# Patient Record
Sex: FEMALE | Race: WHITE | Employment: UNEMPLOYED | URBAN - METROPOLITAN AREA
[De-identification: names, ages, dates, MRNs, and addresses within clinical notes are randomized per-mention and may not be internally consistent; named-entity substitution may affect disease eponyms.]

---

## 2017-07-14 PROBLEM — B35.1 ONYCHOMYCOSIS: Status: ACTIVE | Noted: 2017-07-14

## 2017-07-14 PROBLEM — Z79.4 CONTROLLED TYPE 2 DIABETES MELLITUS WITHOUT COMPLICATION, WITH LONG-TERM CURRENT USE OF INSULIN (HCC): Status: ACTIVE | Noted: 2017-07-14

## 2017-07-14 PROBLEM — E11.9 CONTROLLED TYPE 2 DIABETES MELLITUS WITHOUT COMPLICATION, WITH LONG-TERM CURRENT USE OF INSULIN (HCC): Status: ACTIVE | Noted: 2017-07-14

## 2017-08-11 PROBLEM — Z79.4 CONTROLLED TYPE 2 DIABETES MELLITUS WITHOUT COMPLICATION, WITH LONG-TERM CURRENT USE OF INSULIN (HCC): Status: RESOLVED | Noted: 2017-07-14 | Resolved: 2017-08-11

## 2017-08-11 PROBLEM — E11.9 CONTROLLED TYPE 2 DIABETES MELLITUS WITHOUT COMPLICATION, WITH LONG-TERM CURRENT USE OF INSULIN (HCC): Status: RESOLVED | Noted: 2017-07-14 | Resolved: 2017-08-11

## 2017-08-11 PROBLEM — F32.1 MODERATE SINGLE CURRENT EPISODE OF MAJOR DEPRESSIVE DISORDER (HCC): Status: ACTIVE | Noted: 2017-08-11

## 2018-04-11 PROBLEM — E78.00 PURE HYPERCHOLESTEROLEMIA: Status: ACTIVE | Noted: 2018-04-11

## 2020-03-09 ENCOUNTER — HOSPITAL ENCOUNTER (EMERGENCY)
Age: 40
Discharge: HOME | End: 2020-03-09
Attending: EMERGENCY MEDICINE
Payer: COMMERCIAL

## 2020-03-09 ENCOUNTER — APPOINTMENT (OUTPATIENT)
Dept: ULTRASOUND IMAGING | Age: 40
End: 2020-03-09
Payer: COMMERCIAL

## 2020-03-09 VITALS
BODY MASS INDEX: 26.96 KG/M2 | SYSTOLIC BLOOD PRESSURE: 120 MMHG | TEMPERATURE: 98 F | OXYGEN SATURATION: 99 % | HEIGHT: 69 IN | HEART RATE: 88 BPM | RESPIRATION RATE: 18 BRPM | WEIGHT: 182 LBS | DIASTOLIC BLOOD PRESSURE: 74 MMHG

## 2020-03-09 DIAGNOSIS — O02.1 MISSED ABORTION: Primary | ICD-10-CM

## 2020-03-09 LAB
ABO + RH BLD: NORMAL
ALBUMIN SERPL-MCNC: 3.3 G/DL (ref 3.5–5)
ALBUMIN/GLOB SERPL: 0.8 {RATIO} (ref 1.2–3.5)
ALP SERPL-CCNC: 133 U/L (ref 50–130)
ALT SERPL-CCNC: 24 U/L (ref 12–65)
ANION GAP SERPL CALC-SCNC: 4 MMOL/L (ref 7–16)
AST SERPL-CCNC: 15 U/L (ref 15–37)
BASOPHILS # BLD: 0 K/UL (ref 0–0.2)
BASOPHILS NFR BLD: 0 % (ref 0–2)
BILIRUB SERPL-MCNC: 0.2 MG/DL (ref 0.2–1.1)
BUN SERPL-MCNC: 14 MG/DL (ref 6–23)
CALCIUM SERPL-MCNC: 9.1 MG/DL (ref 8.3–10.4)
CHLORIDE SERPL-SCNC: 102 MMOL/L (ref 98–107)
CO2 SERPL-SCNC: 29 MMOL/L (ref 21–32)
CREAT SERPL-MCNC: 0.75 MG/DL (ref 0.6–1)
DIFFERENTIAL METHOD BLD: ABNORMAL
EOSINOPHIL # BLD: 0.1 K/UL (ref 0–0.8)
EOSINOPHIL NFR BLD: 1 % (ref 0.5–7.8)
ERYTHROCYTE [DISTWIDTH] IN BLOOD BY AUTOMATED COUNT: 13.6 % (ref 11.9–14.6)
GLOBULIN SER CALC-MCNC: 3.9 G/DL (ref 2.3–3.5)
GLUCOSE SERPL-MCNC: 284 MG/DL (ref 65–100)
HCG SERPL-ACNC: 1418 MIU/ML (ref 0–6)
HCT VFR BLD AUTO: 40.7 % (ref 35.8–46.3)
HGB BLD-MCNC: 13.1 G/DL (ref 11.7–15.4)
IMM GRANULOCYTES # BLD AUTO: 0.1 K/UL (ref 0–0.5)
IMM GRANULOCYTES NFR BLD AUTO: 1 % (ref 0–5)
LYMPHOCYTES # BLD: 2.7 K/UL (ref 0.5–4.6)
LYMPHOCYTES NFR BLD: 24 % (ref 13–44)
MCH RBC QN AUTO: 27.8 PG (ref 26.1–32.9)
MCHC RBC AUTO-ENTMCNC: 32.2 G/DL (ref 31.4–35)
MCV RBC AUTO: 86.4 FL (ref 79.6–97.8)
MONOCYTES # BLD: 1 K/UL (ref 0.1–1.3)
MONOCYTES NFR BLD: 9 % (ref 4–12)
NEUTS SEG # BLD: 7.5 K/UL (ref 1.7–8.2)
NEUTS SEG NFR BLD: 65 % (ref 43–78)
NRBC # BLD: 0 K/UL (ref 0–0.2)
PLATELET # BLD AUTO: 489 K/UL (ref 150–450)
PMV BLD AUTO: 8.9 FL (ref 9.4–12.3)
POTASSIUM SERPL-SCNC: 4.1 MMOL/L (ref 3.5–5.1)
PROT SERPL-MCNC: 7.2 G/DL (ref 6.3–8.2)
RBC # BLD AUTO: 4.71 M/UL (ref 4.05–5.2)
SERVICE CMNT-IMP: NORMAL
SODIUM SERPL-SCNC: 135 MMOL/L (ref 136–145)
WBC # BLD AUTO: 11.5 K/UL (ref 4.3–11.1)
WET PREP GENITAL: NORMAL
WET PREP GENITAL: NORMAL

## 2020-03-09 PROCEDURE — 81003 URINALYSIS AUTO W/O SCOPE: CPT

## 2020-03-09 PROCEDURE — 87491 CHLMYD TRACH DNA AMP PROBE: CPT

## 2020-03-09 PROCEDURE — 76817 TRANSVAGINAL US OBSTETRIC: CPT

## 2020-03-09 PROCEDURE — 99284 EMERGENCY DEPT VISIT MOD MDM: CPT

## 2020-03-09 PROCEDURE — 85025 COMPLETE CBC W/AUTO DIFF WBC: CPT

## 2020-03-09 PROCEDURE — 88305 TISSUE EXAM BY PATHOLOGIST: CPT

## 2020-03-09 PROCEDURE — 87210 SMEAR WET MOUNT SALINE/INK: CPT

## 2020-03-09 PROCEDURE — 86900 BLOOD TYPING SEROLOGIC ABO: CPT

## 2020-03-09 PROCEDURE — 80053 COMPREHEN METABOLIC PANEL: CPT

## 2020-03-09 PROCEDURE — 84702 CHORIONIC GONADOTROPIN TEST: CPT

## 2020-03-09 RX ORDER — FOLIC ACID/MULTIVIT,IRON,MINER 0.4MG-18MG
1 TABLET ORAL DAILY
Qty: 30 TAB | Refills: 0 | Status: SHIPPED | OUTPATIENT
Start: 2020-03-09 | End: 2022-02-04

## 2020-03-09 NOTE — ED PROVIDER NOTES
Patient is  3 para 2 and states she is approximately 9 weeks pregnant. She started with some spotting this morning and is now passing clots. She went to [facility] today and saw a nurse but the doctor could not see her so she came here. She states that her first appointment with the OB is 1 week from today. She has never had a miscarriage. She states her blood type is O+. She is not having any chest pain, shortness of breath, abdominal pain, dizziness, weakness, trouble with urination, vaginal discharge or other new symptoms. She did ambulate to the room without difficulty and is well-hydrated. She is having some light cramping. She has not had an ultrasound yet. The history is provided by the patient. Pregnancy Problem    This is a new problem. The current episode started 3 to 5 hours ago. The problem occurs constantly. The problem has been gradually worsening. The pain is located in the suprapubic region. The pain is at a severity of 2/10. The pain is mild. Pertinent negatives include no anorexia, no fever, no belching, no diarrhea, no flatus, no hematochezia, no melena, no nausea, no vomiting, no constipation, no dysuria, no frequency, no hematuria, no headaches, no arthralgias, no myalgias, no trauma, no chest pain and no back pain. Nothing worsens the pain. The pain is relieved by nothing. Past Medical History:   Diagnosis Date    Diabetes ([address]     Hyperlipidemia     PCOD (polycystic ovarian disease)        Past Surgical History:   Procedure Laterality Date    HX APPENDECTOMY      HX CHOLECYSTECTOMY      HX GYN  age 15yo    laparotomy w/  ovarian cystecotomy; believes ovary still intact          History reviewed. No pertinent family history.     Social History     Socioeconomic History    Marital status:      Spouse name: Not on file    Number of children: Not on file    Years of education: Not on file    Highest education level: Not on file   Occupational History    Not on file   Social Needs    Financial resource strain: Not on file    Food insecurity:     Worry: Not on file     Inability: Not on file    Transportation needs:     Medical: Not on file     Non-medical: Not on file   Tobacco Use    Smoking status: Never Smoker    Smokeless tobacco: Never Used   Substance and Sexual Activity    Alcohol use: No     Alcohol/week: 0.0 standard drinks    Drug use: No    Sexual activity: Not on file   Lifestyle    Physical activity:     Days per week: Not on file     Minutes per session: Not on file    Stress: Not on file   Relationships    Social connections:     Talks on phone: Not on file     Gets together: Not on file     Attends Congregation service: Not on file     Active member of club or organization: Not on file     Attends meetings of clubs or organizations: Not on file     Relationship status: Not on file    Intimate partner violence:     Fear of current or ex partner: Not on file     Emotionally abused: Not on file     Physically abused: Not on file     Forced sexual activity: Not on file   Other Topics Concern    Not on file   Social History Narrative    Not on file         ALLERGIES: Shellfish derived    Review of Systems   Constitutional: Negative. Negative for fever. HENT: Negative. Eyes: Negative. Respiratory: Negative. Cardiovascular: Negative. Negative for chest pain. Gastrointestinal: Negative. Negative for anorexia, constipation, diarrhea, flatus, hematochezia, melena, nausea and vomiting. Genitourinary: Positive for pelvic pain. Negative for dysuria, frequency and hematuria. Musculoskeletal: Negative. Negative for arthralgias, back pain and myalgias. Skin: Negative. Neurological: Negative. Negative for headaches. Psychiatric/Behavioral: Negative. All other systems reviewed and are negative.       Vitals:    03/09/20 1424   BP: 126/84   Pulse: (!) 110   Resp: 16   Temp: 98.2 °F (36.8 °C)   SpO2: 99%   Weight: 82.6 kg (182 lb)   Height: 5' 9\" (1.753 m)            Physical Exam  Vitals signs and nursing note reviewed. Constitutional:       Appearance: She is well-developed. HENT:      Head: Normocephalic and atraumatic. Right Ear: External ear normal.      Left Ear: External ear normal.      Nose: Nose normal.   Eyes:      Conjunctiva/sclera: Conjunctivae normal.      Pupils: Pupils are equal, round, and reactive to light. Neck:      Musculoskeletal: Normal range of motion and neck supple. Cardiovascular:      Rate and Rhythm: Normal rate and regular rhythm. Heart sounds: Normal heart sounds. Pulmonary:      Effort: Pulmonary effort is normal.      Breath sounds: Normal breath sounds. Abdominal:      General: Bowel sounds are normal.      Palpations: Abdomen is soft. Musculoskeletal: Normal range of motion. Skin:     General: Skin is warm and dry. Neurological:      Mental Status: She is alert and oriented to person, place, and time. Deep Tendon Reflexes: Reflexes are normal and symmetric. Psychiatric:         Behavior: Behavior normal.         Thought Content: Thought content normal.         Judgment: Judgment normal.          MDM  Number of Diagnoses or Management Options     Amount and/or Complexity of Data Reviewed  Clinical lab tests: reviewed  Tests in the radiology section of CPT®: ordered    Risk of Complications, Morbidity, and/or Mortality  Presenting problems: moderate  Diagnostic procedures: moderate  Management options: moderate           Procedures        8:05 PM Spoke with Dr. Jennifer Casey regarding patient. We discussed the history, physical exam, lab and US findings. Patient states she had her other 2 children in [facility] and that she is certain her blood type is O+. It was not drawn initially when she came in and she does not want to wait on that, however it is back now.  She is going to call [facility] in the morning for a follow-up appointment in 48 hours and a recheck of her quantitative hCG and possible ultrasound if they deem it necessary. She was told to return if worsening in any way. I did prescribe prenatal multivitamins and told her to rest, and drink plenty of fluids. She is stable for discharge and ambulatory out of the ER at this time. Products of conception is pending in the lab. The patient was observed in the ED. Results Reviewed:      Recent Results (from the past 24 hour(s))   CBC WITH AUTOMATED DIFF    Collection Time: 03/09/20  4:05 PM   Result Value Ref Range    WBC 11.5 (H) 4.3 - 11.1 K/uL    RBC 4.71 4.05 - 5.2 M/uL    HGB 13.1 11.7 - 15.4 g/dL    HCT 40.7 35.8 - 46.3 %    MCV 86.4 79.6 - 97.8 FL    MCH 27.8 26.1 - 32.9 PG    MCHC 32.2 31.4 - 35.0 g/dL    RDW 13.6 11.9 - 14.6 %    PLATELET 080 (H) 952 - 450 K/uL    MPV 8.9 (L) 9.4 - 12.3 FL    ABSOLUTE NRBC 0.00 0.0 - 0.2 K/uL    DF AUTOMATED      NEUTROPHILS 65 43 - 78 %    LYMPHOCYTES 24 13 - 44 %    MONOCYTES 9 4.0 - 12.0 %    EOSINOPHILS 1 0.5 - 7.8 %    BASOPHILS 0 0.0 - 2.0 %    IMMATURE GRANULOCYTES 1 0.0 - 5.0 %    ABS. NEUTROPHILS 7.5 1.7 - 8.2 K/UL    ABS. LYMPHOCYTES 2.7 0.5 - 4.6 K/UL    ABS. MONOCYTES 1.0 0.1 - 1.3 K/UL    ABS. EOSINOPHILS 0.1 0.0 - 0.8 K/UL    ABS. BASOPHILS 0.0 0.0 - 0.2 K/UL    ABS. IMM. GRANS. 0.1 0.0 - 0.5 K/UL   METABOLIC PANEL, COMPREHENSIVE    Collection Time: 03/09/20  4:05 PM   Result Value Ref Range    Sodium 135 (L) 136 - 145 mmol/L    Potassium 4.1 3.5 - 5.1 mmol/L    Chloride 102 98 - 107 mmol/L    CO2 29 21 - 32 mmol/L    Anion gap 4 (L) 7 - 16 mmol/L    Glucose 284 (H) 65 - 100 mg/dL    BUN 14 6 - 23 MG/DL    Creatinine 0.75 0.6 - 1.0 MG/DL    GFR est AA >60 >60 ml/min/1.73m2    GFR est non-AA >60 >60 ml/min/1.73m2    Calcium 9.1 8.3 - 10.4 MG/DL    Bilirubin, total 0.2 0.2 - 1.1 MG/DL    ALT (SGPT) 24 12 - 65 U/L    AST (SGOT) 15 15 - 37 U/L    Alk.  phosphatase 133 (H) 50 - 130 U/L    Protein, total 7.2 6.3 - 8.2 g/dL    Albumin 3.3 (L) 3.5 - 5.0 g/dL Globulin 3.9 (H) 2.3 - 3.5 g/dL    A-G Ratio 0.8 (L) 1.2 - 3.5     BETA HCG, QT    Collection Time: 20  4:05 PM   Result Value Ref Range    Beta HCG, QT 1,418 (H) 0.0 - 6.0 MIU/ML   BLOOD TYPE, (ABO+RH)    Collection Time: 20  4:05 PM   Result Value Ref Range    ABO/Rh(D) Zandra Hatchet POSITIVE    WET PREP    Collection Time: 20  7:10 PM   Result Value Ref Range    Special Requests: NO SPECIAL REQUESTS      Wet prep WBC'S  0 TO 5        Wet prep NO YEAST,TRICHOMONAS OR CLUE CELLS NOTED       US [facility]   Final Result   IMPRESSION:  Findings most consistent with incomplete  within the   endocervical canal. Follow-up ultrasound and quantitative beta-hCG levels is   recommended. This structure within the endocervical canal does not have the   appearance of complex nabothian cysts. I discussed the results of all labs, procedures, radiographs, and treatments with the patient and available family. Treatment plan is agreed upon and the patient is ready for discharge. All voiced understanding of the discharge plan and medication instructions or changes as appropriate. Questions about treatment in the ED were answered. All were encouraged to return should symptoms worsen or new problems develop.

## 2020-03-09 NOTE — ED TRIAGE NOTES
Pt in states she is 9 weeks pregnant and started having vaginal bleeding yesterday. states now passing clots. States constipation.

## 2020-03-09 NOTE — LETTER
[id number] [address] EMERGENCY DEPT 
[address] [facility] [id number] [phone] Work/School Note Date: 3/9/2020 To Whom It May concern: 
 
Caty Cardenas was seen and treated today in the emergency room by the following provider(s): 
Attending Provider: Maykel Landon DO Physician Assistant: OSWALDO Aldridge. Caty Cardenas may return to work on 03/12/20. Sincerely, OSWALDO Cruz

## 2020-03-10 NOTE — DISCHARGE INSTRUCTIONS
Patient Education        Call [facility] tomorrow for an appointment on Wednesday, 03/11/20 for follow up. You will need repeat a repeat quantitative HCG and possible US. Return to the ED sooner if worse in anyway. Drink plenty of fluids, rest and no intercourse until follow up. Aborto espontáneo incompleto: Instrucciones de cuidado - [ Incomplete Miscarriage: Care Instructions ]  Instrucciones de cuidado    Un aborto espontáneo es la pérdida de un embarazo que ocurre king las primeras 20 semanas. Los abortos espontáneos son muy comunes. La mayoría ocurren debido a que el óvulo fecundado en el útero no se desarrolla de manera normal. Ni el estrés, ni el ejercicio ni las relaciones sexuales causan un aborto espontáneo. Aunque muchos abortos espontáneos se completan por sí solos, algunos necesitan intervención. Se conocen asha abortos espontáneos incompletos porque no se eliminan todos los tejidos del útero relacionados con Audria Conradi. El aborto espontáneo incompleto con frecuencia requiere tratamiento. Para eliminar los tejidos del útero, se utilizan medicamentos o un procedimiento llamado dilatación y legrado (D&C, por jeremy siglas en inglés). Si enrique kyra sanguíneo es Rh negativo, pregúntele a enrique médico si necesita rober inyección de inmunoglobulina Rh (RhoGAM) para prevenir problemas en embarazos futuros. La atención de seguimiento es rober parte clave de enrique tratamiento y seguridad. Asegúrese de hacer y acudir a todas las citas, y llame a enrique médico si está teniendo problemas. También es rober buena idea saber los resultados de jeremy exámenes y mantener rober lista de los medicamentos que shira. ¿Cómo puede cuidarse en el hogar? · Es probable que tenga sangrado vaginal king 1 a 2 semanas después del tratamiento. Podría ser similar o un poco más intenso que el período menstrual habitual. Use toallas sanitarias en lugar de tampones.  Puede usar tampones king el siguiente período menstrual, el cual debería comenzar dentro de 3 a 6 semanas. · [place] un analgésico (medicamento para el dolor) de venta gentry, asha acetaminofén (Tylenol), ibuprofeno (Advil, Motrin) o naproxeno (Aleve) para los cólicos. Podría sentir cólicos king varios días después del aborto espontáneo. Ingrid y siga todas las instrucciones de la [place]. · No tome dos o más analgésicos al [employer], a menos que el médico se lo haya indicado. Muchos analgésicos contienen acetaminofén, es decir, Tylenol. El exceso de acetaminofén (Tylenol) puede ser dañino. · Enrique médico podría pedirle que guarde cualquier tejido o coágulo que elimine en un recipiente transparente. Llévelo al consultorio médico de inmediato. · No tenga relaciones sexuales hasta que deje de sangrar. · Usted puede volver a jeremy actividades normales si se siente lo suficientemente rivka para hacerlo. Ann debería evitar hacer ejercicio arduo hasta que deje de sangrar. · Si desea volver a tratar de quedar embarazada, generalmente es seguro cuando se sienta lista. Hable con enrique médico acerca de cualquier plan futuro de embarazo. · Si no desea quedar embarazada, hable con enrique médico sobre métodos anticonceptivos. Podría volver a quedar embarazada antes de que comience enrique siguiente período menstrual.  · Podría tener un nivel bajo de dmitry por la pérdida de [place]. Consuma rober dieta equilibrada marco en dmitry y vitamina C. Entre los alimentos ricos en dmitry se encuentran las francisco javier alicea, los [place], los [place], los frijoles (habichuelas) y las verduras de hoja torin. Hable con enrique médico acerca de si usted necesita jacky pastillas de dmitry o un multivitamínico.  · La pérdida de un embarazo puede ser muy difícil. Dese tiempo a sí misma y jennifer tiempo a enrique nicol para procesar el duelo. Incluso si enrique aborto espontáneo ocurrió muy temprano, es posible que aún tenga sentimientos de pérdida. Usted podría preguntarse por qué sucedió y sentirse culpable.   ? Hablar con jeremy familiares, amigos o un consejero puede ayudarle a sobrellevar la pérdida. ? Si jeremy sentimientos de tristeza jacobs más de 2 semanas, hable con enrique médico o un consejero. ¿Cuándo debe pedir ayuda? Llame al 911 en cualquier momento que considere que necesita atención de [place]. Por ejemplo, llame si:    · Tiene dolor repentino e intenso en el abdomen.     · Se desmayó (perdió el conocimiento).    Llame a enrique médico ahora mismo o busque atención médica inmediata si:    · Tiene sangrado vaginal intenso. [place] significa que empapa las toallas sanitarias que suele usar cada hora, king 2 o más horas.     · Siente mareos o aturdimiento, o que está a punto de desmayarse.     · Tiene mayor dolor o dolor nuevo en el abdomen o la pelvis.     · Elimina tejidos, no solo rubia.     · Se siente deprimida o no puede hacer jeremy actividades diarias.    Preste especial atención a los cambios en enrique piyush y asegúrese de comunicarse con enrique médico si:    · Sangra más de lo que habitualmente sangra cuando tiene enrique período menstrual.     · No mejora asha se esperaba.     · Tiene cualquier síntoma nuevo, asha fiebre.     · Tiene flujo vaginal con un olor desagradable. ¿Dónde puede encontrar más información en inglés? Kristen Burt a http://yudi-arlyn.info/. Escriba L103 en la búsqueda para aprender más acerca de \"Aborto espontáneo incompleto: Instrucciones de cuidado - [ Incomplete Miscarriage: Care Instructions ]. \"  Revisado: 29 vill[address]en[address], 2019  Versión del contenido: 12.2  © 7199-0475 [employer]. Las instrucciones de cuidado fueron adaptadas bajo licencia por [facility] (which disclaims liability or warranty for this information). Si usted tiene [place] afección médica o sobre estas instrucciones, siempre pregunte a enrique profesional de piyush. [facility] niega toda garantía o responsabilidad por enrique uso de esta información.

## 2020-03-10 NOTE — ED NOTES
I have reviewed discharge instructions with the patient. The patient verbalized understanding. Patient left ED via Discharge Method: ambulatory to Home with self. Opportunity for questions and clarification provided. Patient given 1 scripts. To continue your aftercare when you leave the hospital, you may receive an automated call from our care team to check in on how you are doing. This is a free service and part of our promise to provide the best care and service to meet your aftercare needs.  If you have questions, or wish to unsubscribe from this service please call [phone]. Thank you for Choosing our [facility] Emergency Department.

## 2020-03-12 LAB
C TRACH RRNA SPEC QL NAA+PROBE: NEGATIVE
N GONORRHOEA RRNA SPEC QL NAA+PROBE: NEGATIVE
SPECIMEN SOURCE: NORMAL

## 2020-06-19 PROBLEM — E11.9 CONTROLLED TYPE 2 DIABETES MELLITUS WITHOUT COMPLICATION, WITH LONG-TERM CURRENT USE OF INSULIN (HCC): Status: ACTIVE | Noted: 2020-06-19

## 2020-06-19 PROBLEM — Z79.4 CONTROLLED TYPE 2 DIABETES MELLITUS WITHOUT COMPLICATION, WITH LONG-TERM CURRENT USE OF INSULIN (HCC): Status: ACTIVE | Noted: 2020-06-19

## 2020-07-19 PROBLEM — N97.9 FEMALE INFERTILITY, SECONDARY: Status: ACTIVE | Noted: 2020-06-16

## 2020-09-10 PROBLEM — Z64.1 MULTIPAROUS: Status: ACTIVE | Noted: 2020-09-10

## 2020-09-10 PROBLEM — O24.111 PRE-EXISTING TYPE 2 DIABETES MELLITUS DURING PREGNANCY IN FIRST TRIMESTER: Status: ACTIVE | Noted: 2020-09-10

## 2020-09-10 PROBLEM — O09.521 AMA (ADVANCED MATERNAL AGE) MULTIGRAVIDA 35+, FIRST TRIMESTER: Status: ACTIVE | Noted: 2020-09-10

## 2020-10-21 PROBLEM — Z98.891 PREVIOUS CESAREAN SECTION: Status: ACTIVE | Noted: 2020-09-10

## 2020-11-04 PROBLEM — O34.12 UTERINE FIBROIDS AFFECTING PREGNANCY IN SECOND TRIMESTER: Status: ACTIVE | Noted: 2020-11-04

## 2020-11-04 PROBLEM — E11.9 CONTROLLED TYPE 2 DIABETES MELLITUS WITHOUT COMPLICATION, WITH LONG-TERM CURRENT USE OF INSULIN (HCC): Status: RESOLVED | Noted: 2020-06-19 | Resolved: 2020-11-04

## 2020-11-04 PROBLEM — O09.92 HIGH-RISK PREGNANCY IN SECOND TRIMESTER: Status: ACTIVE | Noted: 2020-09-10

## 2020-11-04 PROBLEM — Z79.4 CONTROLLED TYPE 2 DIABETES MELLITUS WITHOUT COMPLICATION, WITH LONG-TERM CURRENT USE OF INSULIN (HCC): Status: RESOLVED | Noted: 2020-06-19 | Resolved: 2020-11-04

## 2020-11-04 PROBLEM — E28.2 POLYCYSTIC OVARY AFFECTING PREGNANCY, ANTEPARTUM: Status: ACTIVE | Noted: 2020-11-04

## 2020-11-04 PROBLEM — O09.522 MULTIGRAVIDA OF ADVANCED MATERNAL AGE IN SECOND TRIMESTER: Status: ACTIVE | Noted: 2020-09-10

## 2020-11-04 PROBLEM — O34.80 POLYCYSTIC OVARY AFFECTING PREGNANCY, ANTEPARTUM: Status: ACTIVE | Noted: 2020-11-04

## 2020-11-04 PROBLEM — F32.A DEPRESSION AFFECTING PREGNANCY: Status: ACTIVE | Noted: 2017-08-11

## 2020-11-04 PROBLEM — D25.9 UTERINE FIBROIDS AFFECTING PREGNANCY IN SECOND TRIMESTER: Status: ACTIVE | Noted: 2020-11-04

## 2020-11-04 PROBLEM — E78.00 PURE HYPERCHOLESTEROLEMIA: Status: RESOLVED | Noted: 2018-04-11 | Resolved: 2020-11-04

## 2020-11-04 PROBLEM — O99.340 DEPRESSION AFFECTING PREGNANCY: Status: ACTIVE | Noted: 2017-08-11

## 2020-11-04 PROBLEM — O09.02 PREGNANCY WITH HISTORY OF INFERTILITY IN SECOND TRIMESTER: Status: ACTIVE | Noted: 2020-06-16

## 2020-11-04 PROBLEM — B35.1 ONYCHOMYCOSIS: Status: RESOLVED | Noted: 2017-07-14 | Resolved: 2020-11-04

## 2020-11-04 PROBLEM — O24.112 PRE-EXISTING TYPE 2 DIABETES MELLITUS DURING PREGNANCY IN SECOND TRIMESTER: Status: ACTIVE | Noted: 2020-09-10

## 2020-11-04 PROBLEM — O34.219 HISTORY OF CESAREAN SECTION COMPLICATING PREGNANCY: Status: ACTIVE | Noted: 2020-09-10

## 2020-11-09 ENCOUNTER — HOSPITAL ENCOUNTER (OUTPATIENT)
Dept: DIABETES SERVICES | Age: 40
Discharge: HOME | End: 2020-11-09
Payer: COMMERCIAL

## 2020-11-11 ENCOUNTER — HOSPITAL ENCOUNTER (OUTPATIENT)
Dept: DIABETES SERVICES | Age: 40
Discharge: HOME | End: 2020-11-11
Payer: COMMERCIAL

## 2020-11-11 VITALS — WEIGHT: 237 LBS | BODY MASS INDEX: 35 KG/M2

## 2020-11-11 PROCEDURE — G0109 DIAB MANAGE TRN IND/GROUP: HCPCS

## 2020-11-11 NOTE — PROGRESS NOTES
This is a class  appointment with limited persons allowed in class due to JTLIT-27 public health emergency. Social distancing and mandatory precautions are in place and utilized. Gestational Diabetes Self-Management Support Plan: Pt with type 2 diabetes and pregnant    Services Provided: Pt received education on nutrition and meal planning during pregnancy. Emotional support for adjustment to diagnosis was provided. Pt came for education on 11/9/20 but had a hypoglycemic episode; therefore, returned today for education. Dr. Lopez, endocrinologist notified and CDE asked if the [facility] 2 could be ordered due to pt with hypoglycemia unawareness. Notifed Dr. Villafana Sa office on 11/9/20. Care Plan/Recommendations:  Pt instructed to record blood sugar 4x/day and record all meals and snacks. Pt to bring information to appointments with [facility] Maternal Fetal Medicine. Notes for Follow Up:   1. Barriers to checking blood glucose and adherence to meal plan identified: Has Joseline CGM. Just got the [facility] 2 which has alarms to help prevent hypoglycemia unawareness  2. Barriers to psychological adjustment to diagnosis identified: none  3. Patient needs to be seen by [facility] Fetal Medicine ASAP due to: states she has an appointment 11/16/20.       Juan Alberto Del Rosario, [address], LD, CDE  [facility] [address]

## 2020-12-03 PROBLEM — E78.00 PURE HYPERCHOLESTEROLEMIA: Status: RESOLVED | Noted: 2018-04-11 | Resolved: 2020-12-03

## 2021-01-05 PROBLEM — D25.9 UTERINE FIBROIDS AFFECTING PREGNANCY IN SECOND TRIMESTER: Status: RESOLVED | Noted: 2020-11-04 | Resolved: 2021-01-05

## 2021-01-05 PROBLEM — O34.12 UTERINE FIBROIDS AFFECTING PREGNANCY IN SECOND TRIMESTER: Status: RESOLVED | Noted: 2020-11-04 | Resolved: 2021-01-05

## 2021-02-10 PROBLEM — O24.113 PRE-EXISTING TYPE 2 DIABETES MELLITUS DURING PREGNANCY IN THIRD TRIMESTER: Status: ACTIVE | Noted: 2020-09-10

## 2021-02-23 PROBLEM — O09.93 HIGH-RISK PREGNANCY IN THIRD TRIMESTER: Status: ACTIVE | Noted: 2020-09-10

## 2021-02-23 PROBLEM — O09.03 PREGNANCY WITH HISTORY OF INFERTILITY IN THIRD TRIMESTER: Status: ACTIVE | Noted: 2020-06-16

## 2021-02-23 PROBLEM — O09.523 MULTIGRAVIDA OF ADVANCED MATERNAL AGE IN THIRD TRIMESTER: Status: ACTIVE | Noted: 2020-09-10

## 2021-02-23 PROBLEM — Z3A.31 31 WEEKS GESTATION OF PREGNANCY: Status: ACTIVE | Noted: 2021-02-23

## 2021-03-01 PROBLEM — Z3A.31 31 WEEKS GESTATION OF PREGNANCY: Status: RESOLVED | Noted: 2021-02-23 | Resolved: 2021-03-01

## 2021-03-01 PROBLEM — O99.891 BACTERIURIA DURING PREGNANCY: Status: ACTIVE | Noted: 2021-03-01

## 2021-03-01 PROBLEM — R82.71 BACTERIURIA DURING PREGNANCY: Status: ACTIVE | Noted: 2021-03-01

## 2021-03-16 PROBLEM — Z3A.34 34 WEEKS GESTATION OF PREGNANCY: Status: ACTIVE | Noted: 2021-03-16

## 2021-03-23 PROBLEM — Z3A.34 34 WEEKS GESTATION OF PREGNANCY: Status: RESOLVED | Noted: 2021-03-16 | Resolved: 2021-03-23

## 2021-03-29 PROBLEM — O26.03 EXCESSIVE WEIGHT GAIN DURING PREGNANCY IN THIRD TRIMESTER: Status: ACTIVE | Noted: 2021-03-29

## 2021-04-06 PROBLEM — E78.00 PURE HYPERCHOLESTEROLEMIA: Status: RESOLVED | Noted: 2018-04-11 | Resolved: 2021-04-06

## 2021-04-18 ENCOUNTER — ANESTHESIA EVENT (OUTPATIENT)
Dept: LABOR AND DELIVERY | Age: 41
End: 2021-04-18
Payer: COMMERCIAL

## 2021-04-18 ENCOUNTER — ANESTHESIA (OUTPATIENT)
Dept: LABOR AND DELIVERY | Age: 41
End: 2021-04-18
Payer: COMMERCIAL

## 2021-04-18 ENCOUNTER — HOSPITAL ENCOUNTER (INPATIENT)
Age: 41
LOS: 3 days | Discharge: HOME | End: 2021-04-21
Attending: OBSTETRICS & GYNECOLOGY | Admitting: OBSTETRICS & GYNECOLOGY
Payer: COMMERCIAL

## 2021-04-18 DIAGNOSIS — O24.113 PRE-EXISTING TYPE 2 DIABETES MELLITUS DURING PREGNANCY IN THIRD TRIMESTER: ICD-10-CM

## 2021-04-18 DIAGNOSIS — O34.219 HISTORY OF CESAREAN SECTION COMPLICATING PREGNANCY: ICD-10-CM

## 2021-04-18 DIAGNOSIS — Z98.891 STATUS POST CESAREAN SECTION: Primary | ICD-10-CM

## 2021-04-18 PROBLEM — O42.90 AMNIOTIC FLUID LEAKING: Status: ACTIVE | Noted: 2021-04-18

## 2021-04-18 LAB
ABO + RH BLD: NORMAL
ARTERIAL PATENCY WRIST A: ABNORMAL
BASE DEFICIT BLD-SCNC: 0.8 MMOL/L
BLOOD GROUP ANTIBODIES SERPL: NORMAL
ERYTHROCYTE [DISTWIDTH] IN BLOOD BY AUTOMATED COUNT: 15.2 % (ref 11.9–14.6)
GAS FLOW.O2 O2 DELIVERY SYS: ABNORMAL L/MIN
GLUCOSE BLD STRIP.AUTO-MCNC: 48 MG/DL (ref 65–100)
HCO3 BLD-SCNC: 24.7 MMOL/L (ref 22–26)
HCT VFR BLD AUTO: 37 % (ref 35.8–46.3)
HGB BLD-MCNC: 12 G/DL (ref 11.7–15.4)
MCH RBC QN AUTO: 27.5 PG (ref 26.1–32.9)
MCHC RBC AUTO-ENTMCNC: 32.4 G/DL (ref 31.4–35)
MCV RBC AUTO: 84.7 FL (ref 79.6–97.8)
NRBC # BLD: 0.02 K/UL (ref 0–0.2)
PCO2 BLDCO: 43 MMHG (ref 32–68)
PH BLDCO: 7.37 [PH] (ref 7.15–7.38)
PLATELET # BLD AUTO: 265 K/UL (ref 150–450)
PMV BLD AUTO: 10 FL (ref 9.4–12.3)
PO2 BLDCO: 14 MMHG
RBC # BLD AUTO: 4.37 M/UL (ref 4.05–5.2)
SAO2 % BLD: 15.7 % (ref 95–98)
SERVICE CMNT-IMP: ABNORMAL
SERVICE CMNT-IMP: ABNORMAL
SPECIMEN EXP DATE BLD: NORMAL
SPECIMEN TYPE: ABNORMAL
WBC # BLD AUTO: 14.6 K/UL (ref 4.3–11.1)

## 2021-04-18 PROCEDURE — 74011250636 HC RX REV CODE- 250/636: Performed by: OBSTETRICS & GYNECOLOGY

## 2021-04-18 PROCEDURE — 82803 BLOOD GASES ANY COMBINATION: CPT

## 2021-04-18 PROCEDURE — 74011000250 HC RX REV CODE- 250: Performed by: ANESTHESIOLOGY

## 2021-04-18 PROCEDURE — 77030002974 HC SUT PLN J&J -A: Performed by: OBSTETRICS & GYNECOLOGY

## 2021-04-18 PROCEDURE — 74011000250 HC RX REV CODE- 250: Performed by: STUDENT IN AN ORGANIZED HEALTH CARE EDUCATION/TRAINING PROGRAM

## 2021-04-18 PROCEDURE — 77030002888 HC SUT CHRMC J&J -A: Performed by: OBSTETRICS & GYNECOLOGY

## 2021-04-18 PROCEDURE — 77030032490 HC SLV COMPR SCD KNE COVD -B: Performed by: OBSTETRICS & GYNECOLOGY

## 2021-04-18 PROCEDURE — 86901 BLOOD TYPING SEROLOGIC RH(D): CPT

## 2021-04-18 PROCEDURE — 77030002933 HC SUT MCRYL J&J -A: Performed by: OBSTETRICS & GYNECOLOGY

## 2021-04-18 PROCEDURE — 75410000003 HC RECOV DEL/VAG/CSECN EA 0.5 HR: Performed by: OBSTETRICS & GYNECOLOGY

## 2021-04-18 PROCEDURE — 85027 COMPLETE CBC AUTOMATED: CPT

## 2021-04-18 PROCEDURE — 77030003665 HC NDL SPN BBMI -A: Performed by: ANESTHESIOLOGY

## 2021-04-18 PROCEDURE — 74011000250 HC RX REV CODE- 250: Performed by: OBSTETRICS & GYNECOLOGY

## 2021-04-18 PROCEDURE — 65270000029 HC RM PRIVATE

## 2021-04-18 PROCEDURE — 2709999900 HC NON-CHARGEABLE SUPPLY: Performed by: OBSTETRICS & GYNECOLOGY

## 2021-04-18 PROCEDURE — 59510 CESAREAN DELIVERY: CPT | Performed by: OBSTETRICS & GYNECOLOGY

## 2021-04-18 PROCEDURE — 77030039266 HC ADH SKN EXOFIN S2SG -A: Performed by: OBSTETRICS & GYNECOLOGY

## 2021-04-18 PROCEDURE — 77030007880 HC KT SPN EPDRL BBMI -B: Performed by: ANESTHESIOLOGY

## 2021-04-18 PROCEDURE — 77030034696 HC CATH URETH FOL 2W BARD -A: Performed by: OBSTETRICS & GYNECOLOGY

## 2021-04-18 PROCEDURE — 74011250636 HC RX REV CODE- 250/636: Performed by: STUDENT IN AN ORGANIZED HEALTH CARE EDUCATION/TRAINING PROGRAM

## 2021-04-18 PROCEDURE — 74011250636 HC RX REV CODE- 250/636: Performed by: ANESTHESIOLOGY

## 2021-04-18 PROCEDURE — 76010000392 HC C SECN EA ADDL 0.5 HR: Performed by: OBSTETRICS & GYNECOLOGY

## 2021-04-18 PROCEDURE — 82962 GLUCOSE BLOOD TEST: CPT

## 2021-04-18 PROCEDURE — 74011250637 HC RX REV CODE- 250/637: Performed by: OBSTETRICS & GYNECOLOGY

## 2021-04-18 PROCEDURE — 76010000391 HC C SECN FIRST 1 HR: Performed by: OBSTETRICS & GYNECOLOGY

## 2021-04-18 PROCEDURE — 76060000078 HC EPIDURAL ANESTHESIA: Performed by: OBSTETRICS & GYNECOLOGY

## 2021-04-18 RX ORDER — ONDANSETRON 2 MG/ML
4 INJECTION INTRAMUSCULAR; INTRAVENOUS
Status: DISCONTINUED | OUTPATIENT
Start: 2021-04-18 | End: 2021-04-19

## 2021-04-18 RX ORDER — OXYTOCIN/RINGER'S LACTATE 30/500 ML
PLASTIC BAG, INJECTION (ML) INTRAVENOUS
Status: DISCONTINUED | OUTPATIENT
Start: 2021-04-18 | End: 2021-04-18 | Stop reason: HOSPADM

## 2021-04-18 RX ORDER — SODIUM CHLORIDE, SODIUM LACTATE, POTASSIUM CHLORIDE, CALCIUM CHLORIDE 600; 310; 30; 20 MG/100ML; MG/100ML; MG/100ML; MG/100ML
INJECTION, SOLUTION INTRAVENOUS
Status: DISCONTINUED | OUTPATIENT
Start: 2021-04-18 | End: 2021-04-18 | Stop reason: HOSPADM

## 2021-04-18 RX ORDER — TRISODIUM CITRATE DIHYDRATE AND CITRIC ACID MONOHYDRATE 500; 334 MG/5ML; MG/5ML
30 SOLUTION ORAL
Status: COMPLETED | OUTPATIENT
Start: 2021-04-18 | End: 2021-04-18

## 2021-04-18 RX ORDER — MORPHINE SULFATE 0.5 MG/ML
INJECTION, SOLUTION EPIDURAL; INTRATHECAL; INTRAVENOUS AS NEEDED
Status: DISCONTINUED | OUTPATIENT
Start: 2021-04-18 | End: 2021-04-18 | Stop reason: HOSPADM

## 2021-04-18 RX ORDER — CEFAZOLIN SODIUM/WATER 2 G/20 ML
2 SYRINGE (ML) INTRAVENOUS ONCE
Status: COMPLETED | OUTPATIENT
Start: 2021-04-18 | End: 2021-04-18

## 2021-04-18 RX ORDER — OXYTOCIN/RINGER'S LACTATE 30/500 ML
87.3 PLASTIC BAG, INJECTION (ML) INTRAVENOUS AS NEEDED
Status: COMPLETED | OUTPATIENT
Start: 2021-04-18 | End: 2021-04-18

## 2021-04-18 RX ORDER — OXYCODONE HYDROCHLORIDE 5 MG/1
10 TABLET ORAL
Status: DISCONTINUED | OUTPATIENT
Start: 2021-04-18 | End: 2021-04-19

## 2021-04-18 RX ORDER — NALOXONE HYDROCHLORIDE 0.4 MG/ML
0.2 INJECTION, SOLUTION INTRAMUSCULAR; INTRAVENOUS; SUBCUTANEOUS
Status: DISCONTINUED | OUTPATIENT
Start: 2021-04-18 | End: 2021-04-19

## 2021-04-18 RX ORDER — BUPIVACAINE HYDROCHLORIDE 7.5 MG/ML
INJECTION, SOLUTION INTRASPINAL
Status: COMPLETED | OUTPATIENT
Start: 2021-04-18 | End: 2021-04-18

## 2021-04-18 RX ORDER — ACETAMINOPHEN 325 MG/1
650 TABLET ORAL EVERY 6 HOURS
Status: DISCONTINUED | OUTPATIENT
Start: 2021-04-18 | End: 2021-04-19

## 2021-04-18 RX ORDER — NALBUPHINE HYDROCHLORIDE 10 MG/ML
5 INJECTION, SOLUTION INTRAMUSCULAR; INTRAVENOUS; SUBCUTANEOUS
Status: DISCONTINUED | OUTPATIENT
Start: 2021-04-18 | End: 2021-04-19

## 2021-04-18 RX ORDER — SODIUM CHLORIDE 0.9 % (FLUSH) 0.9 %
5-40 SYRINGE (ML) INJECTION AS NEEDED
Status: DISCONTINUED | OUTPATIENT
Start: 2021-04-18 | End: 2021-04-18 | Stop reason: HOSPADM

## 2021-04-18 RX ORDER — KETOROLAC TROMETHAMINE 30 MG/ML
30 INJECTION, SOLUTION INTRAMUSCULAR; INTRAVENOUS
Status: DISCONTINUED | OUTPATIENT
Start: 2021-04-18 | End: 2021-04-19

## 2021-04-18 RX ORDER — EPHEDRINE SULFATE/0.9% NACL/PF 50 MG/5 ML
SYRINGE (ML) INTRAVENOUS AS NEEDED
Status: DISCONTINUED | OUTPATIENT
Start: 2021-04-18 | End: 2021-04-18 | Stop reason: HOSPADM

## 2021-04-18 RX ORDER — ONDANSETRON 2 MG/ML
INJECTION INTRAMUSCULAR; INTRAVENOUS AS NEEDED
Status: DISCONTINUED | OUTPATIENT
Start: 2021-04-18 | End: 2021-04-18 | Stop reason: HOSPADM

## 2021-04-18 RX ORDER — SODIUM CHLORIDE, SODIUM LACTATE, POTASSIUM CHLORIDE, CALCIUM CHLORIDE 600; 310; 30; 20 MG/100ML; MG/100ML; MG/100ML; MG/100ML
50 INJECTION, SOLUTION INTRAVENOUS CONTINUOUS
Status: DISCONTINUED | OUTPATIENT
Start: 2021-04-18 | End: 2021-04-19

## 2021-04-18 RX ORDER — SODIUM CHLORIDE 0.9 % (FLUSH) 0.9 %
5-40 SYRINGE (ML) INJECTION EVERY 8 HOURS
Status: DISCONTINUED | OUTPATIENT
Start: 2021-04-18 | End: 2021-04-18 | Stop reason: HOSPADM

## 2021-04-18 RX ORDER — MORPHINE SULFATE 0.5 MG/ML
INJECTION, SOLUTION EPIDURAL; INTRATHECAL; INTRAVENOUS
Status: COMPLETED | OUTPATIENT
Start: 2021-04-18 | End: 2021-04-18

## 2021-04-18 RX ORDER — OXYTOCIN/RINGER'S LACTATE 30/500 ML
10 PLASTIC BAG, INJECTION (ML) INTRAVENOUS AS NEEDED
Status: DISCONTINUED | OUTPATIENT
Start: 2021-04-18 | End: 2021-04-19

## 2021-04-18 RX ORDER — DIPHENHYDRAMINE HYDROCHLORIDE 50 MG/ML
12.5 INJECTION, SOLUTION INTRAMUSCULAR; INTRAVENOUS
Status: DISCONTINUED | OUTPATIENT
Start: 2021-04-18 | End: 2021-04-19

## 2021-04-18 RX ORDER — HYDROMORPHONE HYDROCHLORIDE 1 MG/ML
0.5 INJECTION, SOLUTION INTRAMUSCULAR; INTRAVENOUS; SUBCUTANEOUS
Status: DISCONTINUED | OUTPATIENT
Start: 2021-04-18 | End: 2021-04-19

## 2021-04-18 RX ADMIN — ONDANSETRON 4 MG: 2 INJECTION INTRAMUSCULAR; INTRAVENOUS at 21:43

## 2021-04-18 RX ADMIN — PHENYLEPHRINE HYDROCHLORIDE 100 MCG: 10 INJECTION INTRAVENOUS at 22:07

## 2021-04-18 RX ADMIN — Medication 10 MG: at 21:32

## 2021-04-18 RX ADMIN — MORPHINE SULFATE 2.85 MG: 0.5 INJECTION, SOLUTION EPIDURAL; INTRATHECAL; INTRAVENOUS at 22:16

## 2021-04-18 RX ADMIN — MORPHINE SULFATE 2 MG: 0.5 INJECTION, SOLUTION EPIDURAL; INTRATHECAL; INTRAVENOUS at 22:14

## 2021-04-18 RX ADMIN — SODIUM CHLORIDE, SODIUM LACTATE, POTASSIUM CHLORIDE, AND CALCIUM CHLORIDE: 600; 310; 30; 20 INJECTION, SOLUTION INTRAVENOUS at 21:11

## 2021-04-18 RX ADMIN — OXYTOCIN 500 ML/HR: 10 INJECTION, SOLUTION INTRAMUSCULAR; INTRAVENOUS at 21:42

## 2021-04-18 RX ADMIN — AZITHROMYCIN MONOHYDRATE 500 MG: 500 INJECTION, POWDER, LYOPHILIZED, FOR SOLUTION INTRAVENOUS at 20:13

## 2021-04-18 RX ADMIN — SODIUM CHLORIDE, SODIUM LACTATE, POTASSIUM CHLORIDE, AND CALCIUM CHLORIDE: 600; 310; 30; 20 INJECTION, SOLUTION INTRAVENOUS at 21:28

## 2021-04-18 RX ADMIN — BUPIVACAINE HYDROCHLORIDE IN DEXTROSE 12 MG: 7.5 INJECTION, SOLUTION SUBARACHNOID at 21:18

## 2021-04-18 RX ADMIN — Medication 87.3 MILLI-UNITS/MIN: at 22:53

## 2021-04-18 RX ADMIN — SODIUM CITRATE AND CITRIC ACID MONOHYDRATE 30 ML: 500; 334 SOLUTION ORAL at 20:29

## 2021-04-18 RX ADMIN — MORPHINE SULFATE 0.15 MG: 0.5 INJECTION, SOLUTION EPIDURAL; INTRATHECAL; INTRAVENOUS at 21:18

## 2021-04-18 RX ADMIN — CEFAZOLIN SODIUM 2 G: 100 INJECTION, POWDER, LYOPHILIZED, FOR SOLUTION INTRAVENOUS at 21:03

## 2021-04-18 RX ADMIN — FAMOTIDINE 20 MG: 10 INJECTION, SOLUTION INTRAVENOUS at 20:29

## 2021-04-18 NOTE — H&P
History & Physical    Name: Kristin Ta MRN: [id number]  SSN: [id number]    YOB: 1980  Age: 45 y.o. Sex: female        Subjective: LOF  46 yo [id number] presents at 38+5wks with lof starting at 1700. She denies ctx discomfort or vb. She reports normal FM. Pregnancy has been c/b pregestational DM 2 on Tresiba 32 QHS, Novolog , and Metformin 1000mg BID, AMA, maternal obesity, cystitis, and h/o  delivery x 2.       Estimated Date of Delivery: 21  OB History    Para Term  AB Living   4 2 2   1 2   SAB TAB Ectopic Molar Multiple Live Births   1         2      # Outcome Date GA Lbr Abraham/2nd Weight Sex Delivery Anes PTL Lv   4 Current            3 SAB 2020           2 Term 14   3 kg F CS-Unspec   JERAD   1 Term 10/21/11   3.5 kg M CS-Unspec   JERAD         Past Medical History:   Diagnosis Date    Controlled type 2 diabetes mellitus without complication, with long-term current use of insulin ([address] 2020    Hyperlipidemia     Infertility, female     Onychomycosis 2017    PCOD (polycystic ovarian disease)     Pure hypercholesterolemia 2018    Pure hypercholesterolemia 2018    Pure hypercholesterolemia 2018    Uterine fibroids affecting pregnancy in second trimester 2020     Past Surgical History:   Procedure Laterality Date    HX APPENDECTOMY      HX  SECTION      x2    HX CHOLECYSTECTOMY      HX GYN  age 15yo    laparotomy w/  ovarian cystecotomy; believes ovary still intact      Social History     Occupational History    Occupation: Homemaker   Tobacco Use    Smoking status: Never Smoker    Smokeless tobacco: Never Used   Substance and Sexual Activity    Alcohol use: No     Alcohol/week: 0.0 standard drinks    Drug use: No    Sexual activity: Yes     Partners: Male     Birth control/protection: None     Family History   Problem Relation Age of Onset    Colon Cancer Paternal Uncle     Diabetes Maternal Grandmother  Diabetes Maternal Grandfather     Diabetes Paternal Grandmother     Diabetes Paternal Grandfather     Breast Cancer Neg Hx     Ovarian Cancer Neg Hx     Prostate Cancer Neg Hx     Deep Vein Thrombosis Neg Hx     Pulmonary Embolism Neg Hx     Hypertension Neg Hx        Allergies   Allergen Reactions    Shellfish Derived Anaphylaxis     Hives,face swells      Prior to Admission medications    Medication Sig Start Date End Date Taking? Authorizing Provider   metFORMIN (GLUCOPHAGE) 1,000 mg tablet Take 1 Tab by mouth two (2) times daily (with meals). 4/2/21   MD Tyler Posey Kil FlexTouch U-200 200 unit/mL (3 mL) inpn 32 Units by SubCUTAneous route nightly. 3/10/21   Lew Barrera MD   HumaLOG KwikPen Insulin 100 unit/mL kwikpen 14 units with breakfast, 16 units with lunch and 22 units with supper with correction scale 2/50>120 (up to 60 units daily) 3/10/21   MD Laly Cam Ultra Blue Test Strip strip 4 times a day 3/10/21   MD Gael Camuch Delica Lancets 30 gauge misc 4 times a day 3/10/21   Lew Barrera MD   . Linkpass 2 Sensor kit Change every 14 days 3/10/21   Lew Barrera MD   glucagon (Glucagon Emergency Kit, human,) 1 mg injection For severe hypoglycemia 3/10/21   MD Julian Cam 2 [place] misc Use as directed 11/9/20   Lew Barrera MD   prenatal vitamin (PRENATAL MULTIVITAMINS) 28 mg iron- 800 mcg tab tablet Take 1 Tab by mouth daily. 3/9/20   Elyssa Rangel PA        Review of Systems: Pertinent items are noted in HPI. 10 point ROS was otherwise positive for a symptomatic low blood sugar just prior to presentation. Pt felt poorly and sweaty. She checked her BS and it was 54. She drank a glucose drink and her symptoms resolved. Objective:     Vitals: T98.1, P 81, /75, RR 16  There were no vitals filed for this visit. Physical Exam:  Patient without distress.   Heart: Regular rate and rhythm  Lung: clear to auscultation throughout lung fields and normal respiratory effort  Abdomen: soft, nontender  Fundus: soft and non tender  Perineum: blood absent, amniotic fluid present  Cervical Exam: 1 cm dilated    70% effaced    -2 station    Lower Extremities:  - Edema 1+  Amnisure POSITIVE  Membranes:  Spontaneous Rupture of Membranes; Amniotic Fluid: clear fluid  Fetal Heart Rate: Baseline: 130s per minute  Variability: moderate  Accelerations: yes  Decelerations: variable x 1  Uterine contractions: none    Prenatal Labs:   Lab Results   Component Value Date/Time    ABO/Rh(D) O POSITIVE 2020 04:05 PM         Assessment/Plan: 44 yo  at 38+5wks presents with SROM. I reviewed and interpreted the NST as a category 1 tracing. Active Problems:    * No active hospital problems. *       Plan:   1. Admit for RLTCS. 2. DM 2: BS was 93 per pt subcu monitor in triage. Will decrease insulin regimen postop. The pt said that recently her BS has been running low. 3. SROM: Azithromycin added to preop antibiotic prophylaxis.

## 2021-04-19 LAB
ARTERIAL PATENCY WRIST A: ABNORMAL
BASE DEFICIT BLD-SCNC: 1.1 MMOL/L
GAS FLOW.O2 O2 DELIVERY SYS: ABNORMAL L/MIN
GLUCOSE BLD STRIP.AUTO-MCNC: 131 MG/DL (ref 65–100)
GLUCOSE BLD STRIP.AUTO-MCNC: 148 MG/DL (ref 65–100)
GLUCOSE BLD STRIP.AUTO-MCNC: 170 MG/DL (ref 65–100)
GLUCOSE BLD STRIP.AUTO-MCNC: 56 MG/DL (ref 65–100)
GLUCOSE BLD STRIP.AUTO-MCNC: 74 MG/DL (ref 65–100)
HCO3 BLDV-SCNC: 23.9 MMOL/L (ref 23–28)
HCT VFR BLD AUTO: 30.7 % (ref 35.8–46.3)
HGB BLD-MCNC: 10 G/DL (ref 11.7–15.4)
PCO2 BLDCO: 40 MMHG (ref 32–68)
PH BLDCO: 7.39 [PH] (ref 7.15–7.38)
PO2 BLDCO: 16 MMHG
SAO2 % BLDV: 20.2 % (ref 65–88)
SERVICE CMNT-IMP: ABNORMAL
SERVICE CMNT-IMP: NORMAL
SPECIMEN TYPE: ABNORMAL

## 2021-04-19 PROCEDURE — 82962 GLUCOSE BLOOD TEST: CPT

## 2021-04-19 PROCEDURE — 59025 FETAL NON-STRESS TEST: CPT

## 2021-04-19 PROCEDURE — 74011250637 HC RX REV CODE- 250/637: Performed by: OBSTETRICS & GYNECOLOGY

## 2021-04-19 PROCEDURE — 2709999900 HC NON-CHARGEABLE SUPPLY

## 2021-04-19 PROCEDURE — 65270000029 HC RM PRIVATE

## 2021-04-19 PROCEDURE — 74011250636 HC RX REV CODE- 250/636: Performed by: ANESTHESIOLOGY

## 2021-04-19 PROCEDURE — 36415 COLL VENOUS BLD VENIPUNCTURE: CPT

## 2021-04-19 PROCEDURE — 74011636637 HC RX REV CODE- 636/637: Performed by: OBSTETRICS & GYNECOLOGY

## 2021-04-19 PROCEDURE — 85018 HEMOGLOBIN: CPT

## 2021-04-19 PROCEDURE — 99283 EMERGENCY DEPT VISIT LOW MDM: CPT

## 2021-04-19 PROCEDURE — 84112 EVAL AMNIOTIC FLUID PROTEIN: CPT

## 2021-04-19 RX ORDER — SODIUM CHLORIDE 0.9 % (FLUSH) 0.9 %
5-40 SYRINGE (ML) INJECTION AS NEEDED
Status: DISCONTINUED | OUTPATIENT
Start: 2021-04-19 | End: 2021-04-21 | Stop reason: ALTCHOICE

## 2021-04-19 RX ORDER — SIMETHICONE 80 MG
80 TABLET,CHEWABLE ORAL
Status: DISCONTINUED | OUTPATIENT
Start: 2021-04-19 | End: 2021-04-21 | Stop reason: HOSPADM

## 2021-04-19 RX ORDER — SODIUM CHLORIDE 0.9 % (FLUSH) 0.9 %
5-40 SYRINGE (ML) INJECTION EVERY 8 HOURS
Status: DISCONTINUED | OUTPATIENT
Start: 2021-04-19 | End: 2021-04-21 | Stop reason: ALTCHOICE

## 2021-04-19 RX ORDER — ONDANSETRON 2 MG/ML
4 INJECTION INTRAMUSCULAR; INTRAVENOUS
Status: DISCONTINUED | OUTPATIENT
Start: 2021-04-19 | End: 2021-04-21 | Stop reason: ALTCHOICE

## 2021-04-19 RX ORDER — NALOXONE HYDROCHLORIDE 0.4 MG/ML
0.4 INJECTION, SOLUTION INTRAMUSCULAR; INTRAVENOUS; SUBCUTANEOUS AS NEEDED
Status: DISCONTINUED | OUTPATIENT
Start: 2021-04-19 | End: 2021-04-21 | Stop reason: HOSPADM

## 2021-04-19 RX ORDER — METFORMIN HYDROCHLORIDE 500 MG/1
1000 TABLET ORAL 2 TIMES DAILY WITH MEALS
Status: DISCONTINUED | OUTPATIENT
Start: 2021-04-19 | End: 2021-04-21 | Stop reason: HOSPADM

## 2021-04-19 RX ORDER — INSULIN GLARGINE 100 [IU]/ML
16 INJECTION, SOLUTION SUBCUTANEOUS
Status: DISCONTINUED | OUTPATIENT
Start: 2021-04-19 | End: 2021-04-21 | Stop reason: HOSPADM

## 2021-04-19 RX ORDER — DIPHENHYDRAMINE HCL 25 MG
25-50 CAPSULE ORAL
Status: DISCONTINUED | OUTPATIENT
Start: 2021-04-19 | End: 2021-04-21 | Stop reason: HOSPADM

## 2021-04-19 RX ORDER — OXYCODONE HYDROCHLORIDE 5 MG/1
5-10 TABLET ORAL
Status: DISCONTINUED | OUTPATIENT
Start: 2021-04-19 | End: 2021-04-21 | Stop reason: HOSPADM

## 2021-04-19 RX ORDER — IBUPROFEN 800 MG/1
800 TABLET ORAL
Status: DISCONTINUED | OUTPATIENT
Start: 2021-04-19 | End: 2021-04-21 | Stop reason: HOSPADM

## 2021-04-19 RX ORDER — ZOLPIDEM TARTRATE 5 MG/1
5 TABLET ORAL
Status: DISCONTINUED | OUTPATIENT
Start: 2021-04-19 | End: 2021-04-21 | Stop reason: HOSPADM

## 2021-04-19 RX ORDER — DEXTROSE, SODIUM CHLORIDE, SODIUM LACTATE, POTASSIUM CHLORIDE, AND CALCIUM CHLORIDE 5; .6; .31; .03; .02 G/100ML; G/100ML; G/100ML; G/100ML; G/100ML
1000 INJECTION, SOLUTION INTRAVENOUS CONTINUOUS
Status: DISCONTINUED | OUTPATIENT
Start: 2021-04-19 | End: 2021-04-21 | Stop reason: ALTCHOICE

## 2021-04-19 RX ORDER — DOCUSATE SODIUM 100 MG/1
100 CAPSULE, LIQUID FILLED ORAL 2 TIMES DAILY
Status: DISCONTINUED | OUTPATIENT
Start: 2021-04-19 | End: 2021-04-21 | Stop reason: HOSPADM

## 2021-04-19 RX ADMIN — INSULIN GLARGINE 16 UNITS: 100 INJECTION, SOLUTION SUBCUTANEOUS at 22:55

## 2021-04-19 RX ADMIN — INSULIN LISPRO 4 UNITS: 100 INJECTION, SOLUTION INTRAVENOUS; SUBCUTANEOUS at 19:02

## 2021-04-19 RX ADMIN — METFORMIN HYDROCHLORIDE 1000 MG: 500 TABLET ORAL at 17:31

## 2021-04-19 RX ADMIN — KETOROLAC TROMETHAMINE 30 MG: 30 INJECTION, SOLUTION INTRAMUSCULAR at 17:30

## 2021-04-19 RX ADMIN — DOCUSATE SODIUM 100 MG: 100 CAPSULE, LIQUID FILLED ORAL at 22:54

## 2021-04-19 RX ADMIN — SODIUM CHLORIDE, POTASSIUM CHLORIDE, SODIUM LACTATE AND CALCIUM CHLORIDE 50 ML/HR: 600; 310; 30; 20 INJECTION, SOLUTION INTRAVENOUS at 01:48

## 2021-04-19 RX ADMIN — METFORMIN HYDROCHLORIDE 1000 MG: 500 TABLET ORAL at 08:52

## 2021-04-19 RX ADMIN — KETOROLAC TROMETHAMINE 30 MG: 30 INJECTION, SOLUTION INTRAMUSCULAR at 09:33

## 2021-04-19 RX ADMIN — IBUPROFEN 800 MG: 800 TABLET, FILM COATED ORAL at 23:06

## 2021-04-19 RX ADMIN — INSULIN LISPRO 2 UNITS: 100 INJECTION, SOLUTION INTRAVENOUS; SUBCUTANEOUS at 14:01

## 2021-04-19 NOTE — LACTATION NOTE
In to see mom and infant for the first time. Mom stated that she is nursing infant but does not think that he is \"getting anything\" so she is formula supplementing. Reviewed the fact that infant is getting \"something\" but not much volume. She also stated that she thinks her milk is starting to come in. Offered to set up a breast pump for her to use but she declined. She does have a personal breast pump at home to use. Reviewed second night of life. Lactation consultant will follow up tomorrow.

## 2021-04-19 NOTE — OP NOTES
Section Delivery Operative Report       Patient: Caty Abreu MRN: [id number]  SSN: [id number]    YOB: 1980  Age: 45 y.o. Sex: female       Date of Procedure: 2021     Preoperative Diagnosis: 45 w 5d; prior uterine surgery; PROM    Postoperative Diagnosis: same as pre-op    Procedure: repeat low transverse  section    Anesthesia: Spinal    Prophylactic Antibiotics: ancef/zithromax    Estimated Blood Loss:      Information for the patient's :  Cami Coleman [[id number]]   Delivery of a 7 lb 15.7 oz (3.62 kg) male infant on 2021 at 9:41 PM. Apgars were 9  and 9 . Umbilical Cord: 3 Vessels     Umbilical Cord Events:       Placenta: Expressed removal with Normal;Intact appearance. Amniotic Fluid Volume: Moderate     Amniotic Fluid Description:  Clear         Specimens:   ID Type Source Tests Collected by Time Destination   1 :  Placenta Placenta  Chip Hdz MD 2021 Discarded               Complications:  none    Procedure Details: The patient was taken to the operating room, where Spinal anesthesia was administered and found to be adequate. Conley catheter had been placed using sterile technique. The patient was prepped and draped in the normal sterile fashion. The abdomen was entered using the Pfannenstiel technique. Fascia was incised. The peritoneum was entered sharply well superior to the bladder and extended with blunt dissection. The bladder blade was then inserted. The vesicouterine peritoneum was identified and entered sharply with Metzenbaum scissors. The bladder flap was then created sharply and the bladder blade was reinserted. A low transverse uterine incision was made with the scalpel and extended with blunt dissection. Amniotomy was performed and there was clear fluid. The babys head was then delivered atraumatically and then the baby was delivered onto the mother's legs.  The cord was clamped and cut and the baby was handed off to the waiting  care unit staff. Placenta was then expressed from the uterus. The uterus was exteriorized and cleared of all clots and debris. The uterine incision was closed with number 0 vicryl in running locking fashion and a second imbricating stitch . The ovaries and tubes were examined and found to be normal.  The posterior cul-de-sac was irrigated with warm normal saline. Good hemostasis was again reassured and the uterus was returned to the abdomen. The anterior pelvis was irrigated with warm normal saline and good hemostasis was again reassured throughout. Peritoneum was closed. The fascia was closed with 0 vicryl in a running fashion. Good hemostasis was assured. The skin was closed with a barbed 3.0 monocryl in a subcuticular fashion. The patient tolerated the procedure well. Sponge, lap, and needle counts were correct times three and the patient and baby were taken to recovery/postpartum room in stable condition.     Signed By: Ethan Morales MD     2021

## 2021-04-19 NOTE — PROGRESS NOTES
Orders received from Dr. Myra Bunn for POC glucose ac and hs.  Call for >200     Consult in the a.m. to Dr. Ngoc Roberts MD for dosing of insulin

## 2021-04-19 NOTE — ANESTHESIA PREPROCEDURE EVALUATION
Relevant Problems   No relevant active problems       Anesthetic History   No history of anesthetic complications            Review of Systems / Medical History  Patient summary reviewed and pertinent labs reviewed    Pulmonary  Within defined limits                 Neuro/Psych   Within defined limits           Cardiovascular                  Exercise tolerance: >4 METS     GI/Hepatic/Renal                Endo/Other    Diabetes    Obesity     Other Findings   Comments: Hb 12.0  Plts 265    2 prior CSx         Physical Exam    Airway  Mallampati: II  TM Distance: 4 - 6 cm  Neck ROM: normal range of motion   Mouth opening: Normal     Cardiovascular    Rhythm: regular  Rate: normal         Dental  No notable dental hx       Pulmonary  Breath sounds clear to auscultation               Abdominal  GI exam deferred       Other Findings            Anesthetic Plan    ASA: 2, emergent  Anesthesia type: spinal            Anesthetic plan and risks discussed with: Patient and Spouse

## 2021-04-19 NOTE — PROGRESS NOTES
Call placed to Dr. Kendrick Zapien office for 2 week follow up visit. Patient has visit already for May 14, no other appointments open before then, but office will put patient on wait list if something sooner comes open.

## 2021-04-19 NOTE — L&D DELIVERY NOTE
Delivery Summary    Patient: Robert Ontiveros MRN: [id number]  SSN: [id number]    YOB: 1980  Age: 45 y.o. Sex: female        Information for the patient's :  Rubina Blevins [[id number]]       Labor Events:    Labor: No    Steroids: None   Cervical Ripening Date/Time:       Cervical Ripening Type: None   Antibiotics During Labor: No   Rupture Identifier:      Rupture Date/Time: 2021 5:00 PM   Rupture Type:     Amniotic Fluid Volume:  Moderate    Amniotic Fluid Description: Clear    Amniotic Fluid Odor: None    Induction: None       Induction Date/Time:        Indications for Induction:      Augmentation: None   Augmentation Date/Time:      Indications for Augmentation:     Labor complications: None       Additional complications:        Delivery Events:  Indications For Episiotomy:     Episiotomy:     Perineal Laceration(s):     Repaired:     Periurethral Laceration Location:      Repaired:     Labial Laceration Location:     Repaired:     Sulcal Laceration Location:     Repaired:     Vaginal Laceration Location:     Repaired:     Cervical Laceration Location:     Repaired:     Repair Suture:     Number of Repair Packets:     Estimated Blood Loss (ml):  ml   Quantitaive Blood Loss (ml):             Delivery Date: 2021    Delivery Time: 9:41 PM   Delivery Type: , Low Transverse     Details    Trial of Labor: No   Primary/Repeat: Repeat   Priority: Routine   Indications:  Prior Uterine Surgery       Sex:  Male     Gestational Age: 44w7d  Delivery Clinician:  Carmen Barrett  Living Status: Living   Delivery Location: L&D 0R 1          APGARS  One minute Five minutes Ten minutes   Skin color: 1   1        Heart rate: 2   2        Grimace: 2   2        Muscle tone: 2   2        Breathin   2        Totals: 9   9          Presentation: Vertex    Position:        Resuscitation Method:  Tactile Stimulation;Suctioning-bulb     Meconium Stained: None Cord Information: 3 Vessels  Complications:    Cord around:    Delayed cord clamping? No  Cord clamped date/time:   Disposition of Cord Blood: Lab    Blood Gases Sent?: Yes    Placenta:  Date/Time: 2021  9:41 PM  Removal: Expressed      Appearance: Normal;Intact      Measurements:  Birth Weight: 7 lb 15.7 oz (3.62 kg)      Birth Length: 51 cm      Head Circumference: 35.5 cm      Chest Circumference: 34 cm     Abdominal Girth:       Other Providers:   Ghazala CLEMENT;TAYLOR PUGH;BETSY CAVANAUGH;COY BRIZUELA;ELIZ LEE;JOAQUIN VELAZQUEZ;ROSANA RUSSELL, Obstetrician;Primary Nurse;Neonatologist;Anesthesiologist;Crna;Nursery Nurse;Respiratory Therapist             Group B Strep: No results found for: GRBSEXT, GRBSEXT  Information for the patient's :  Babatundeberenice Rimma [[id number]]     Lab Results   Component Value Date/Time    ABO/Rh(D) O POSITIVE 2021 10:00 PM    ALHAJI IgG NEG 2021 10:00 PM      Recent Labs     21  2152   PCO2CB 43   PO2CB 14   HCO3I 24.7   SO2I 15.7*   IBD 0.8   SPECTI ARTERIAL CORD   PHICB 7.37   IDEV ROOM AIR   IALLEN NOT APPLICABLE

## 2021-04-19 NOTE — PROGRESS NOTES
SBAR OUT Report: Mother    Verbal report given to Samantha Mace RN (full name & credentials) on this patient, who is now being transferred to MIU (unit) for routine post - op. The patient is wearing a green \"Anesthesia-Duramorph\" band. Report consisted of patient's Situation, Background, Assessment and Recommendations (SBAR).  ID bands were compared with the identification form, and verified with the patient and receiving nurse. Information from the SBAR and the [address] [facility] Report was reviewed with the receiving nurse; opportunity for questions and clarification provided.

## 2021-04-19 NOTE — PROGRESS NOTES
Admission assessment complete as noted. Patient oriented to room and unit. Plan of care reviewed and patient verbalizes understanding. Questions encouraged and answered. Patent encouraged to call for needs or concerns. Safety Teaching reviewed:   1. Hand hygiene prior to handling the infant. 2. Use of bulb syringe. 3. Bracelets with matching numbers are placed on mother and infant  4. An infant security tag  [facility]) is placed on the infant's ankle and monitored  5. All OB nurses wear pink Employee badges - do not give your baby to anyone without proper identification. 6. Never leave the baby alone in the room. 7. The infant should be placed on their back to sleep. on a firm mattress. No toys should be placed in the crib. (safe sleep video offered to view)  8. Never shake the baby (video offered to view)  9. Infant fall prevention - do not sleep with the baby, and place the baby in the crib while ambulating. 8. Mother and Baby Care booklet given to Mother.

## 2021-04-19 NOTE — ANESTHESIA POSTPROCEDURE EVALUATION
Procedure(s):   SECTION.     spinal    Anesthesia Post Evaluation      Multimodal analgesia: multimodal analgesia used between 6 hours prior to anesthesia start to PACU discharge  Patient location during evaluation: floor  Patient participation: complete - patient participated  Level of consciousness: awake and alert  Pain management: adequate  Airway patency: patent  Anesthetic complications: no  Cardiovascular status: acceptable  Respiratory status: acceptable  Hydration status: acceptable  Post anesthesia nausea and vomiting:  controlled  Final Post Anesthesia Temperature Assessment:  Normothermia (36.0-37.5 degrees C)      INITIAL Post-op Vital signs:  Visit Vitals  /68 (BP 1 Location: Left upper arm, BP Patient Position: Sitting)   Pulse 71   Temp 36.9 °C (98.4 °F)   Resp 18   SpO2 100%   Breastfeeding Unknown

## 2021-04-19 NOTE — PROGRESS NOTES
[facility]  [address] [place] [address], [address]  [address], [place], KOLE [place]    Pt is S/P repeat  at 45 5/7 weeks due to labor. No complaints today. Normal PO pain. No GI/ issues. Lochia < menses. No F/C. VITALS  Patient Vitals for the past 24 hrs:   Temp Pulse Resp BP SpO2   21 0735 97.9 °F (36.6 °C) 78 18 128/74 99 %   21 0420 98.4 °F (36.9 °C) 71 18 120/68 100 %   21 0102 97.8 °F (36.6 °C) 62 16 109/63 100 %   21 0043 -- 63 13 132/61 100 %   21 0030 -- 67 13 126/61 100 %   21 0015 -- 60 13 (!) 148/80 99 %   21 0000 -- (!) 56 13 (!) 140/63 98 %   21 2345 -- (!) 58 12 (!) 109/58 99 %   21 2330 -- 66 13 (!) 118/53 98 %   21 2313 -- 62 13 136/67 98 %   21 2301 -- 64 14 135/76 99 %   21 2256 -- 60 12 (!) 140/67 97 %   21 2251 -- 66 14 132/70 98 %   21 2243 97.3 °F (36.3 °C) 72 12 (!) 108/59 97 %   21 1918 98.1 °F (36.7 °C) 81 16 131/75 --        CV - RRR  LUNGS - CTA bilaterally  ABD - soft, NABS, appropriate tenderness, incision C/D/I  EXT - tr edema bilaterally    Labs:    Recent Results (from the past 24 hour(s))   TYPE & SCREEN    Collection Time: 21  7:47 PM   Result Value Ref Range    Crossmatch Expiration 2021,2359     ABO/Rh(D) Elease Tulio POSITIVE     Antibody screen NEG    CBC W/O DIFF    Collection Time: 21  7:47 PM   Result Value Ref Range    WBC 14.6 (H) 4.3 - 11.1 K/uL    RBC 4.37 4.05 - 5.2 M/uL    HGB 12.0 11.7 - 15.4 g/dL    HCT 37.0 35.8 - 46.3 %    MCV 84.7 79.6 - 97.8 FL    MCH 27.5 26.1 - 32.9 PG    MCHC 32.4 31.4 - 35.0 g/dL    RDW 15.2 (H) 11.9 - 14.6 %    PLATELET 908 690 - 725 K/uL    MPV 10.0 9.4 - 12.3 FL    ABSOLUTE NRBC 0.02 0.0 - 0.2 K/uL   BLOOD GAS, CORD BLOOD    Collection Time: 21  9:52 PM   Result Value Ref Range    Device: ROOM AIR      pH, cord blood (POC) 7.37 7. 15 - 7.38      pCO2 cord blood 43 32 - 68 mmHg    pO2 cord blood 14 mmHg    HCO3 (POC) 24.7 22 - 26 MMOL/L    sO2 (POC) 15.7 (L) 95 - 98 %    Base deficit (POC) 0.8 mmol/L    Allens test (POC) NOT APPLICABLE      Specimen type (POC) ARTERIAL CORD      Performed by [address] [place] [address], CORD BLOOD    Collection Time: 04/18/21  9:52 PM   Result Value Ref Range    Device: ROOM AIR      pH, cord blood (POC) 7.39 (H) 7.15 - 7.38      pCO2 cord blood 40 32 - 68 mmHg    pO2 cord blood 16 mmHg    HCO3, venous (POC) 23.9 23 - 28 MMOL/L    sO2, venous (POC) 20.2 (L) 65 - 88 %    Base deficit (POC) 1.1 mmol/L    Allens test (POC) NOT APPLICABLE      Specimen type (POC) VENOUS CORD      Performed by Damien    GLUCOSE, POC    Collection Time: 04/18/21 11:33 PM   Result Value Ref Range    Glucose (POC) 48 (L) 65 - 100 mg/dL    Performed by Keren    GLUCOSE, POC    Collection Time: 04/19/21 12:00 AM   Result Value Ref Range    Glucose (POC) 56 (L) 65 - 100 mg/dL    Performed by Keren    GLUCOSE, POC    Collection Time: 04/19/21  6:14 AM   Result Value Ref Range    Glucose (POC) 74 65 - 100 mg/dL    Performed by Christine    HGB & HCT    Collection Time: 04/19/21  6:31 AM   Result Value Ref Range    HGB 10.0 (L) 11.7 - 15.4 g/dL    HCT 30.7 (L) 35.8 - 46.3 %       POD #1 LTCS     Pt is breast feeding. No issues or complaints today. Stable. Insulin adjustments by MFM. (+) ambulating, voiding.  Routine PP/PO care    Ardyth Seip, MD  10:25 AM  04/19/21

## 2021-04-19 NOTE — PROGRESS NOTES
FSBS 48, pt states she feels fine and is not symptomatic   Pt self monitor reading 61  Given 4oz juice, will recheck in 15 min per policy    6395 FSBS repeated after 15 min, result 56, pt remains asymptomatic  Dr Paul Ugalde notified, ok with result  Pt given sandwich and will recheck as ordered    0038 self monitor reads 130

## 2021-04-19 NOTE — PROGRESS NOTES
SBAR IN Report: Mother    Verbal report received from Ashly Thomas RN (full name & credentials) on this patient, who is now being transferred from L&D (unit) for routine progression of care. The patient is wearing a green \"Anesthesia-Duramorph\" band. Report consisted of patient's Situation, Background, Assessment and Recommendations (SBAR).  ID bands were compared with the identification form, and verified with the patient and transferring nurse. Information from the SBAR, Intake/Output, MAR, Recent Results and Quality Measures and the [place] Report was reviewed with the transferring nurse; opportunity for questions and clarification provided.

## 2021-04-19 NOTE — PROGRESS NOTES
MFM    Pt on metformin 1000bid, tresiba 26u qhs, novolog 6 w meals prior to pregnancy. Recommend   Fasting/1hr glucose checks  Metformin 1000bid  Lantus 16 qhs. Will adjust as needed. Pt to see Dr Aubrey Gasca with [facility] in next 2 weeks. Lizbet Brooks          Addendum-     SSI ordered for meal coverage.    ED

## 2021-04-19 NOTE — ANESTHESIA PROCEDURE NOTES
Spinal Block    Start time: 4/18/2021 9:15 PM  End time: 4/18/2021 9:18 PM  Performed by: Tory Tabor MD  Authorized by: Tory Tabor MD     Pre-procedure:   Indications: at surgeon's request and primary anesthetic  Preanesthetic Checklist: patient identified, risks and benefits discussed, anesthesia consent, site marked, patient being monitored and timeout performed    Timeout Time: 21:18          Spinal Block:   Patient Position:  Seated  Prep Region:  Lumbar  Prep: chlorhexidine      Location:  L3-4  Technique:  Single shot        Needle:   Needle Type:  Pencan  Needle Gauge:  25 G  Attempts:  1      Events: CSF confirmed, no blood with aspiration and no paresthesia        Assessment:  Insertion:  Uncomplicated  Patient tolerance:  Patient tolerated the procedure well with no immediate complications

## 2021-04-19 NOTE — PROGRESS NOTES
Anesthesiology  Post-op Note    Post-op day 1 s/p  via spinal with neuraxial opioids for post-op pain management. Visit Vitals  /68 (BP 1 Location: Left upper arm, BP Patient Position: Sitting)   Pulse 71   Temp 36.9 °C (98.4 °F)   Resp 18   LMP 2020   SpO2 100%   Breastfeeding Unknown     Airway patent, patient appropriately hydrated and appears euvolemic. Patient is Alert and oriented. Pain is well controlled. Pruritus is absent. Nausea is well controlled. No complaints about back or site of injection. Motor and sensory function has returned to baseline in lower extremities. Patient is satisfied with anesthetic and reports no complications. Continue current orders, then initiate surgeon's orders for pain management 24 hours after . Follow up per surgeon.        John Live MD

## 2021-04-19 NOTE — PROGRESS NOTES
Admission assessment complete as noted. Patient oriented to room and unit. Plan of care reviewed and patient verbalizes understanding. Questions encouraged and answered. Patent encouraged to call for needs or concerns. Safety Teaching reviewed:   1. Hand hygiene prior to handling the infant. 2. Use of bulb syringe. 3. Bracelets with matching numbers are placed on mother and infant  4. An infant security tag  [facility]) is placed on the infant's ankle and monitored  5. All OB nurses wear pink Employee badges - do not give your baby to anyone without proper identification. 6. Never leave the baby alone in the room. 7. The infant should be placed on their back to sleep. on a firm mattress. No toys should be placed in the crib. (safe sleep video offered to view)  8. Never shake the baby (video offered to view)  9. Infant fall prevention - do not sleep with the baby, and place the baby in the crib while ambulating. 8. Mother and Baby Care booklet given to Mother.

## 2021-04-19 NOTE — PROGRESS NOTES
Conley catheter removed. Pt up OOB with assistance to bathroom. Amy care performed/educated. Pt verbalizes understanding. Instructed to call for needs or concerns.

## 2021-04-20 LAB
ARTERIAL PATENCY WRIST A: ABNORMAL
BASE DEFICIT BLD-SCNC: 1.1 MMOL/L
GAS FLOW.O2 O2 DELIVERY SYS: ABNORMAL L/MIN
GLUCOSE BLD STRIP.AUTO-MCNC: 101 MG/DL (ref 65–100)
GLUCOSE BLD STRIP.AUTO-MCNC: 111 MG/DL (ref 65–100)
GLUCOSE BLD STRIP.AUTO-MCNC: 177 MG/DL (ref 65–100)
GLUCOSE BLD STRIP.AUTO-MCNC: 62 MG/DL (ref 65–100)
GLUCOSE BLD STRIP.AUTO-MCNC: 62 MG/DL (ref 65–100)
HCO3 BLDV-SCNC: 23.9 MMOL/L (ref 23–28)
PCO2 BLDCO: 40 MMHG (ref 32–68)
PH BLDCO: 7.39 [PH] (ref 7.15–7.38)
PO2 BLDCO: 16 MMHG
SAO2 % BLDV: 20.2 % (ref 65–88)
SERVICE CMNT-IMP: ABNORMAL
SPECIMEN TYPE: ABNORMAL

## 2021-04-20 PROCEDURE — 2709999900 HC NON-CHARGEABLE SUPPLY

## 2021-04-20 PROCEDURE — 65270000029 HC RM PRIVATE

## 2021-04-20 PROCEDURE — 74011636637 HC RX REV CODE- 636/637: Performed by: OBSTETRICS & GYNECOLOGY

## 2021-04-20 PROCEDURE — 82962 GLUCOSE BLOOD TEST: CPT

## 2021-04-20 PROCEDURE — 74011250637 HC RX REV CODE- 250/637: Performed by: OBSTETRICS & GYNECOLOGY

## 2021-04-20 RX ADMIN — IBUPROFEN 800 MG: 800 TABLET, FILM COATED ORAL at 08:30

## 2021-04-20 RX ADMIN — SIMETHICONE 80 MG: 80 TABLET, CHEWABLE ORAL at 08:30

## 2021-04-20 RX ADMIN — DOCUSATE SODIUM 100 MG: 100 CAPSULE, LIQUID FILLED ORAL at 17:21

## 2021-04-20 RX ADMIN — METFORMIN HYDROCHLORIDE 1000 MG: 500 TABLET ORAL at 17:21

## 2021-04-20 RX ADMIN — INSULIN GLARGINE 16 UNITS: 100 INJECTION, SOLUTION SUBCUTANEOUS at 22:18

## 2021-04-20 RX ADMIN — IBUPROFEN 800 MG: 800 TABLET, FILM COATED ORAL at 23:11

## 2021-04-20 RX ADMIN — IBUPROFEN 800 MG: 800 TABLET, FILM COATED ORAL at 17:21

## 2021-04-20 RX ADMIN — METFORMIN HYDROCHLORIDE 1000 MG: 500 TABLET ORAL at 08:30

## 2021-04-20 RX ADMIN — DOCUSATE SODIUM 100 MG: 100 CAPSULE, LIQUID FILLED ORAL at 08:30

## 2021-04-20 NOTE — PROGRESS NOTES
Chart reviewed - no needs identified. SW met with patient while social distancing w/appropriate PPE. Patient denies any history of postpartum depression/anxiety. Patient given informational packet on  mood & anxiety disorders (resources/education). Family denies any additional needs from  at this time. Family has 's contact information should any needs/questions arise.     AMA Smalls-MICHELLE  [facility]   [phone]

## 2021-04-20 NOTE — LACTATION NOTE
This note was copied from a baby's chart. Lactation visit. In to check on feeds. Doing breast and bottle feeds. Mom states baby latches well. Takes bottle well. No concerns. Answered questions about milk production. Discussed supply and demand. Keep breastfeeding every feeding to stimulate milk supply. Offered help at the breast as needed.

## 2021-04-20 NOTE — PROGRESS NOTES
Pt states according to her Blood Sugar monitor, BS is going up now, 70 at this time.  Denies s/s of hyopglycemia

## 2021-04-20 NOTE — PROGRESS NOTES
Pt had requested abd binder, Dr. Kalpesh Sears was called, orders received. Pt was given binder with instructions. States she will get her spouse to help her put it on. Encouraged her to call out for help as needed.

## 2021-04-20 NOTE — PROGRESS NOTES
Progress Note                               Patient: Evelin Pearce MRN: [id number]  SSN: [id number]    YOB: 1980  Age: 45 y.o. Sex: female      2 Days Post-Op     Subjective:     Patient doing well postpartum without significant complaints. Voiding without difficulty. Patient reports normal lochia. . Breastfeeding: Yes     Objective:     Patient Vitals for the past 18 hrs:   Temp Pulse Resp BP SpO2   21 0735 97.4 °F (36.3 °C) 65 16 113/64 100 %   21 2300 98 °F (36.7 °C) 64 16 118/75 100 %       Temp (24hrs), Av.7 °F (36.5 °C), Min:97.4 °F (36.3 °C), Max:98 °F (36.7 °C)      Physical Exam:    General:   Patient without distress. Abdomen: Soft, fundus firm at level of umbilicus, nontender   Incision: Clean, dry, and intact without erythema   Lower Extremities: Negative for swelling, cords, or tenderness        Lab/Data Review: All lab results for the last 24 hours reviewed. GBS: No results found for: GRBSEXT, GRBSEXT  Blood Type:   Lab Results   Component Value Date/Time    ABO/Rh(D) O POSITIVE 2021 07:47 PM       Sugars good. Assessment and Plan:     Patient appears to be having an uncomplicated post- course. Continue routine postoperative care and maternal education.

## 2021-04-20 NOTE — PROGRESS NOTES
Patient's blood sugar 62. Brandon crackers, peanut butter and orange juice given to patient and instructed to eat for low blood sugar.

## 2021-04-21 VITALS
TEMPERATURE: 98.1 F | DIASTOLIC BLOOD PRESSURE: 67 MMHG | SYSTOLIC BLOOD PRESSURE: 114 MMHG | RESPIRATION RATE: 16 BRPM | HEART RATE: 80 BPM | OXYGEN SATURATION: 97 %

## 2021-04-21 LAB
GLUCOSE BLD STRIP.AUTO-MCNC: 83 MG/DL (ref 65–100)
SERVICE CMNT-IMP: NORMAL

## 2021-04-21 PROCEDURE — 74011250637 HC RX REV CODE- 250/637: Performed by: OBSTETRICS & GYNECOLOGY

## 2021-04-21 PROCEDURE — 82962 GLUCOSE BLOOD TEST: CPT

## 2021-04-21 RX ORDER — FUROSEMIDE 20 MG/1
TABLET ORAL
Qty: 5 TAB | Refills: 0 | Status: SHIPPED | OUTPATIENT
Start: 2021-04-21 | End: 2021-05-03

## 2021-04-21 RX ORDER — OXYCODONE HYDROCHLORIDE 5 MG/1
5 TABLET ORAL
Qty: 10 TAB | Refills: 0 | Status: SHIPPED | OUTPATIENT
Start: 2021-04-21 | End: 2021-04-28

## 2021-04-21 RX ORDER — IBUPROFEN 800 MG/1
800 TABLET ORAL
Qty: 40 TAB | Refills: 0 | Status: SHIPPED | OUTPATIENT
Start: 2021-04-21 | End: 2021-09-13

## 2021-04-21 RX ADMIN — METFORMIN HYDROCHLORIDE 1000 MG: 500 TABLET ORAL at 08:03

## 2021-04-21 RX ADMIN — IBUPROFEN 800 MG: 800 TABLET, FILM COATED ORAL at 08:03

## 2021-04-21 RX ADMIN — DOCUSATE SODIUM 100 MG: 100 CAPSULE, LIQUID FILLED ORAL at 08:03

## 2021-04-21 NOTE — PROGRESS NOTES
Post-Operative Day Number 3 Progress Note  Mello Guillaume  [id number]    Patient doing well post-op day 3 from  delivery without significant complaints. Pain controlled on current medication. Voiding without difficulty, normal lochia. Tolerating regular diet. + flatus. Vitals:    Patient Vitals for the past 8 hrs:   BP Temp Pulse Resp SpO2   21 0707 114/67 98.1 °F (36.7 °C) 80 16 97 %     Temp (24hrs), Av.2 °F (36.8 °C), Min:98.1 °F (36.7 °C), Max:98.4 °F (36.9 °C)      Vital signs stable, afebrile. Fluid balance -825cc    Exam:  Patient without distress. Abdomen soft, fundus firm at level of umbilicus, nontender. Incision dry and                      clean without erythema. 1+ edema of feet and shins. Labs:   Recent Results (from the past 24 hour(s))   GLUCOSE, POC    Collection Time: 21  2:49 PM   Result Value Ref Range    Glucose (POC) 177 (H) 65 - 100 mg/dL    Performed by Alisa Park, POC    Collection Time: 21  7:05 PM   Result Value Ref Range    Glucose (POC) 111 (H) 65 - 100 mg/dL    Performed by Courtney    GLUCOSE, POC    Collection Time: 21  9:38 AM   Result Value Ref Range    Glucose (POC) 83 65 - 100 mg/dL    Performed by Kay        Assessment and Plan:  Patient appears to be having uncomplicated post- course. Continue routine post-op care and maternal education. Pt instructed to continue lantus 16U each evening with SSI as needed. She says she has an endo appt for early may. Will give course of lasix.

## 2021-04-21 NOTE — PROGRESS NOTES
Discharge instructions completed. Patient voiced understanding. Prescriptions sent electronically. Patient discharged home via wheelchair.

## 2021-04-21 NOTE — DISCHARGE INSTRUCTIONS
Patient Education         Section: What to Expect at Home  Your Recovery     A  section, or , is surgery to deliver your baby through a cut that the doctor makes in your lower belly and uterus. The cut is called an incision. You may have some pain in your lower belly and need pain medicine for 1 to 2 weeks. You can expect some vaginal bleeding for several weeks. You will probably need about 6 weeks to fully recover. It's important to take it easy while the incision heals. Avoid heavy lifting, strenuous activities, and exercises that strain the belly muscles while you recover. Ask a family member or friend for help with housework, cooking, and shopping. This care sheet gives you a general idea about how long it will take for you to recover. But each person recovers at a different pace. Follow the steps below to get better as quickly as possible. How can you care for yourself at home? Activity    · Rest when you feel tired. Getting enough sleep will help you recover.     · Try to walk each day. Start by walking a little more than you did the day before. Bit by bit, increase the amount you walk. Walking boosts blood flow and helps prevent pneumonia, constipation, and blood clots.     · Avoid strenuous activities, such as bicycle riding, jogging, weightlifting, and aerobic exercise, for 6 weeks or until your doctor says it is okay.     · Until your doctor says it is okay, do not lift anything heavier than your baby.     · Do not do sit-ups or other exercises that strain the belly muscles for 6 weeks or until your doctor says it is okay.     · Hold a pillow over your incision when you cough or take deep breaths. This will support your belly and decrease your pain.     · You may shower as usual. Pat the incision dry when you are done.     · You will have some vaginal bleeding. Wear sanitary pads.  Do not douche or use tampons until your doctor says it is okay.     · Ask your doctor when you can drive again.     · You will probably need to take at least 6 weeks off work. It depends on the type of work you do and how you feel.     · Ask your doctor when it is okay for you to have sex. Diet    · You can eat your normal diet. If your stomach is upset, try bland, low-fat foods like plain rice, broiled chicken, toast, and yogurt.     · Drink plenty of fluids (unless your doctor tells you not to).     · You may notice that your bowel movements are not regular right after your surgery. This is common. Try to avoid constipation and straining with bowel movements. You may want to take a fiber supplement every day. If you have not had a bowel movement after a couple of days, ask your doctor about taking a mild laxative.     · If you are breastfeeding, limit alcohol. Alcohol can cause a lack of energy and other health problems for the baby when a breastfeeding woman drinks heavily. It can also get in the way of a mom's ability to feed her baby or to care for the child in other ways. There isn't a lot of research about exactly how much alcohol can harm a baby. Having no alcohol is the safest choice for your baby. If you choose to have a drink now and then, have only one drink, and limit the number of occasions that you have a drink. Wait to breastfeed at least 2 hours after you have a drink to reduce the amount of alcohol the baby may get in the milk. Medicines    · Your doctor will tell you if and when you can restart your medicines. He or she will also give you instructions about taking any new medicines.     · If you take aspirin or some other blood thinner, ask your doctor if and when to start taking it again. Make sure that you understand exactly what your doctor wants you to do.     · Take pain medicines exactly as directed. ? If the doctor gave you a prescription medicine for pain, take it as prescribed.   ? If you are not taking a prescription pain medicine, ask your doctor if you can take an over-the-counter medicine.     · If you think your pain medicine is making you sick to your stomach:  ? Take your medicine after meals (unless your doctor has told you not to). ? Ask your doctor for a different pain medicine.     · If your doctor prescribed antibiotics, take them as directed. Do not stop taking them just because you feel better. You need to take the full course of antibiotics. Incision care    · If you have strips of tape on the incision, leave the tape on for a week or until it falls off.     · Wash the area daily with warm, soapy water, and pat it dry. Don't use hydrogen peroxide or alcohol, which can slow healing. You may cover the area with a gauze bandage if it weeps or rubs against clothing. Change the bandage every day.     · Keep the area clean and dry. Other instructions    · If you breastfeed your baby, you may be more comfortable while you are healing if you place the baby so that he or she is not resting on your belly. Try tucking your baby under your arm, with his or her body along the side you will be feeding on. Support your baby's upper body with your arm. With that hand you can control your baby's head to bring his or her mouth to your breast. This is sometimes called the football hold. Follow-up care is a key part of your treatment and safety. Be sure to make and go to all appointments, and call your doctor if you are having problems. It's also a good idea to know your test results and keep a list of the medicines you take. When should you call for help? Call  911 anytime you think you may need emergency care. For example, call if:    · You have thoughts of harming yourself, your baby, or another person.     · You passed out (lost consciousness).     · You have chest pain, are short of breath, or cough up blood.     · You have a seizure.    Call your doctor now or seek immediate medical care if:    · You have pain that does not get better after you take pain medicine.     · You have severe vaginal bleeding.     · You are dizzy or lightheaded, or you feel like you may faint.     · You have new or worse pain in your belly or pelvis.     · You have loose stitches, or your incision comes open.     · You have symptoms of infection, such as:  ? Increased pain, swelling, warmth, or redness. ? Red streaks leading from the incision. ? Pus draining from the incision. ? A fever.     · You have symptoms of a blood clot in your leg (called a deep vein thrombosis), such as:  ? Pain in your calf, back of the knee, thigh, or groin. ? Redness and swelling in your leg or groin.     · You have signs of preeclampsia, such as:  ? Sudden swelling of your face, hands, or feet. ? New vision problems (such as dimness, blurring, or seeing spots). ? A severe headache. Watch closely for changes in your health, and be sure to contact your doctor if:    · You do not get better as expected. Where can you learn more? Go to http://www.whitney.com/  Enter M806 in the search box to learn more about \" Section: What to Expect at Home. \"  Current as of: 2020               Content Version: 12.8   [employer]. Care instructions adapted under license by [employer] (which disclaims liability or warranty for this information). If you have questions about a medical condition or this instruction, always ask your healthcare professional. [place] [address] any warranty or liability for your use of this information.

## 2021-04-21 NOTE — DISCHARGE SUMMARY
Obstetrical Discharge Summary     Name: Evelin Pearce MRN: [id number]  SSN: [id number]    YOB: 1980  Age: 45 y.o. Sex: female      Allergies: Shellfish derived    Admit Date: 2021    Discharge Date: 2021     Admitting Physician: Yonny Burch MD     Attending Physician:  Jorge Patterson MD     * Admission Diagnoses: History of  [S44.600]; Amniotic fluid leaking [O42.90]    * Discharge Diagnoses:   Information for the patient's :  Adali Sr [[id number]]   Delivery of a 7 lb 15.7 oz (3.62 kg) male infant via , Low Transverse on 2021 at 9:41 PM  by Hardik Monzon. Apgars were 9  and 9 . Additional Diagnoses:   Hospital Problems as of 2021 Date Reviewed: 2021          Codes Class Noted - Resolved POA    * (Principal) Amniotic fluid leaking ICD-10-CM: O42.90  ICD-9-CM: 658.10  2021 - Present Unknown        Pre-existing type 2 diabetes mellitus during pregnancy in third trimester ICD-10-CM: O24.113  ICD-9-CM: 648.03, 250.00  9/10/2020 - Present Yes    Overview Addendum 2021  4:14 PM by Wong Olsen MD     Dxd w/ DM  ([address] [place] [address]) was on insulin. 2020      HgbA1C   15.3   (Referred to endocrine, Dr. Lawyer Martinez)  2020    HgbA1C    6.5  7w: HgbA1C 5.7,   Insulin: NovoLog , Gwyn Yeni 28. Metformin 1000 mg twice daily with meals. Dr. Lawyer Martinez manages  12w:  Novolog , Gwyn Yeni qhs, Metformin bid. 2020 at [facility]: Normal early anatomy; Glucose log reviewed. Ranges from 61 to 222. Overall poor control. 50% BS elevations. Current Regimen is NovoLog 12, Tresiba 28u,  Metformin 1000 mg twice daily with meals. Reviewed diabetic diet and carbohydrate counting. Pt has approximately 30 day supply of Tresiba at home. Instructed to not refill as we will change insulin to Levemir at that time.   2020: referred to:  Opthalmology (per pt sees Ramy yearly, last appt 3/2019), IM  2020:   Libby RYDER, 24h urine 210 mg protein    . .. 2021 at [facility]: Borderline accelerated fetal growth AC 72%, Overall 75%, SAURABH 16cm. Glucose log reviewed. Ranges from 73 to 155. FBS . PP elevations . Overall good control. Current regimen: Tresiba 32 QHS, Novolog 20,24,26, Metformin 1000 mg BID with meals    · No follow up MFM-refer back as needed  · Continue twice weekly testing with [facility]  · Call or email if blood sugars running too high or low  ·  milk expression program beginning at 40 weeks-given 21  · Plan for delivery at 39 weeks, unless concerns for maternal or fetal well-being-hold am insulin/meds morning of c/s.  · F/U with Endocrinology 4 weeks after delivery-has appt 21                 History of  section complicating pregnancy [id number]: O34.219  ICD-9-CM: 654.20  9/10/2020 - Present Yes    Overview Addendum 2020 12:02 PM by Peggy Aragon RN     G1 and G2 c-sections. 2020 at [facility]: Repeat c/s    SEE DM2 Overview                      Lab Results   Component Value Date/Time    ABO/Rh(D) O POSITIVE 2021 07:47 PM      Immunization History   Administered Date(s) Administered    Influenza Vaccine (>6 mo Afluria QUAD Vial 60555 (0.25 mL) / 10865 (0.5 mL)) 2015    Influenza Vaccine (Quad) Mdck Pf (>4 Yrs Flucelvax QUAD 35237) 2017    Influenza Vaccine [employer]) PF (>6 Mo Flulaval, Fluarix, and >3 Yrs Duane Mylar 20118) 2018, 10/02/2020       * Procedures:   Procedure(s):   SECTION           * Discharge Condition: good    * Hospital Course: Normal hospital course following the delivery. * Disposition: Home    Discharge Medications:   Current Discharge Medication List      START taking these medications    Details   ibuprofen (MOTRIN) 800 mg tablet Take 1 Tab by mouth every eight (8) hours as needed for Pain.   Qty: 40 Tab, Refills: 0      oxyCODONE IR (ROXICODONE) 5 mg immediate release tablet Take 1 Tab by mouth every six (6) hours as needed for Pain for up to 7 days. Max Daily Amount: 20 mg.  Qty: 10 Tab, Refills: 0    Associated Diagnoses: Status post  section      furosemide (Lasix) 20 mg tablet 1 po qAM x5 days  Qty: 5 Tab, Refills: 0         CONTINUE these medications which have NOT CHANGED    Details   metFORMIN (GLUCOPHAGE) 1,000 mg tablet Take 1 Tab by mouth two (2) times daily (with meals). Qty: 60 Tab, Refills: 2    Associated Diagnoses: Pre-existing type 2 diabetes mellitus during pregnancy in third trimester      Tresiba FlexTouch U-200 200 unit/mL (3 mL) inpn 32 Units by SubCUTAneous route nightly. Qty: 2 Pen, Refills: 11    Associated Diagnoses: Pre-existing type 2 diabetes mellitus during pregnancy in third trimester      OneTouch Ultra Blue Test Strip strip 4 times a day  Qty: 150 Strip, Refills: 11    Associated Diagnoses: Pre-existing type 2 diabetes mellitus during pregnancy in third trimester      OneTouch Delica Lancets 30 gauge misc 4 times a day  Qty: 150 Lancet, Refills: 11    Associated Diagnoses: Pre-existing type 2 diabetes mellitus during pregnancy in third trimester      FreeStyle Joseline 2 Sensor kit Change every 14 days  Qty: 2 Kit, Refills: 11    Associated Diagnoses: Pre-existing type 2 diabetes mellitus during pregnancy in third trimester      glucagon (Glucagon Emergency Kit, human,) 1 mg injection For severe hypoglycemia  Qty: 1 Vial, Refills: 11    Associated Diagnoses: Pre-existing type 2 diabetes mellitus during pregnancy in third trimester      FreeStyle Joseline 2 Sun misc Use as directed  Qty: 1 Each, Refills: 0    Associated Diagnoses: Pre-existing type 2 diabetes mellitus during pregnancy in second trimester      prenatal vitamin (PRENATAL MULTIVITAMINS) 28 mg iron- 800 mcg tab tablet Take 1 Tab by mouth daily.   Qty: 30 Tab, Refills: 0         STOP taking these medications       HumaLOG KwikPen Insulin 100 unit/mL kwikpen Comments:   Reason for Stopping:               * Follow-up Care/Patient Instructions: Activity: No sex for 6 weeks, No driving while on analgesics and No heavy lifting for 6 weeks  Diet: Diabetic Diet  Wound Care: As directed    Take Lantus 16U each evening.      Follow-up Information     Follow up With Specialties Details Why Contact Info    Yuri Mcdaniels MD Obstetrics & Gynecology, Gynecology, Obstetrics Schedule an appointment as soon as possible for a visit in 2 weeks  [address], [facility] [address]   [address]  [phone]

## 2021-04-21 NOTE — LACTATION NOTE
Mom and baby are going home today. Continue to offer the breast without restriction. Mom's milk should be fully in over the next few days. Reviewed engorgement precautions. Hand Expression has been demoed and written hand-out reviewed. As milk comes in baby will be more alert at the breast and swallows will be more obvious. Breasts may feel softer once baby has finished nursing. Baby should be back to birth weight by 3weeks of age. And then gain on average 1 oz per day for the next 2-3 months. Reviewed babies should be exclusively breastfeeding for the first 6 months and that breastfeeding should continue after introduction of appropriate complimentary foods after 6 months. Initial output should be at least 1 wet and 1 bowel movement for each day old baby is. By day 5-7 once milk is fully in baby will consistently have 6 or more soaking wet diapers and about 4 bowel movement. Some babies have a bowel movement with every feeding and some have 1-3 large bowel movements each day. Inadequate output may indicate inadequate feedings and should be reported to your Pediatrician. Bowel habits may change as baby gets older. Encouraged follow-up at Pediatrician in 1-2 days, no later than 1 week of age. Call [facility] for any questions as needed or to set up an OP visit. OP phone calls are returned within 24 hours. Community Breastfeeding Resource List given.

## 2021-05-14 PROBLEM — O09.93 HIGH-RISK PREGNANCY IN THIRD TRIMESTER: Status: RESOLVED | Noted: 2020-09-10 | Resolved: 2021-05-14

## 2021-05-14 PROBLEM — E28.2 POLYCYSTIC OVARY AFFECTING PREGNANCY, ANTEPARTUM: Status: RESOLVED | Noted: 2020-11-04 | Resolved: 2021-05-14

## 2021-05-14 PROBLEM — O09.523 MULTIGRAVIDA OF ADVANCED MATERNAL AGE IN THIRD TRIMESTER: Status: RESOLVED | Noted: 2020-09-10 | Resolved: 2021-05-14

## 2021-05-14 PROBLEM — O24.113 PRE-EXISTING TYPE 2 DIABETES MELLITUS DURING PREGNANCY IN THIRD TRIMESTER: Status: RESOLVED | Noted: 2020-09-10 | Resolved: 2021-05-14

## 2021-05-14 PROBLEM — O34.80 POLYCYSTIC OVARY AFFECTING PREGNANCY, ANTEPARTUM: Status: RESOLVED | Noted: 2020-11-04 | Resolved: 2021-05-14

## 2021-05-14 PROBLEM — O09.03 PREGNANCY WITH HISTORY OF INFERTILITY IN THIRD TRIMESTER: Status: RESOLVED | Noted: 2020-06-16 | Resolved: 2021-05-14

## 2021-05-14 PROBLEM — O34.219 HISTORY OF CESAREAN SECTION COMPLICATING PREGNANCY: Status: RESOLVED | Noted: 2020-09-10 | Resolved: 2021-05-14

## 2021-05-14 PROBLEM — E11.65 UNCONTROLLED TYPE 2 DIABETES MELLITUS WITH HYPERGLYCEMIA, WITH LONG-TERM CURRENT USE OF INSULIN (HCC): Status: ACTIVE | Noted: 2021-05-14

## 2021-05-14 PROBLEM — Z79.4 UNCONTROLLED TYPE 2 DIABETES MELLITUS WITH HYPERGLYCEMIA, WITH LONG-TERM CURRENT USE OF INSULIN (HCC): Status: ACTIVE | Noted: 2021-05-14

## 2021-05-14 PROBLEM — O42.90 AMNIOTIC FLUID LEAKING: Status: RESOLVED | Noted: 2021-04-18 | Resolved: 2021-05-14

## 2021-05-14 PROBLEM — O26.03 EXCESSIVE WEIGHT GAIN DURING PREGNANCY IN THIRD TRIMESTER: Status: RESOLVED | Noted: 2021-03-29 | Resolved: 2021-05-14

## 2021-05-14 PROBLEM — O99.891 BACTERIURIA DURING PREGNANCY: Status: RESOLVED | Noted: 2021-03-01 | Resolved: 2021-05-14

## 2021-05-14 PROBLEM — R82.71 BACTERIURIA DURING PREGNANCY: Status: RESOLVED | Noted: 2021-03-01 | Resolved: 2021-05-14

## 2021-09-13 PROBLEM — E78.2 MIXED HYPERLIPIDEMIA: Status: ACTIVE | Noted: 2018-04-11

## 2021-12-23 ENCOUNTER — HOSPITAL ENCOUNTER (OUTPATIENT)
Dept: MAMMOGRAPHY | Age: 41
Discharge: HOME | End: 2021-12-23
Attending: OBSTETRICS & GYNECOLOGY
Payer: COMMERCIAL

## 2021-12-23 DIAGNOSIS — Z12.31 SCREENING MAMMOGRAM FOR BREAST CANCER: ICD-10-CM

## 2021-12-23 PROCEDURE — 77067 SCR MAMMO BI INCL CAD: CPT

## 2022-01-06 ENCOUNTER — HOSPITAL ENCOUNTER (OUTPATIENT)
Dept: LAB | Age: 42
Discharge: HOME | End: 2022-01-06
Payer: COMMERCIAL

## 2022-01-06 DIAGNOSIS — R79.0 LOW IRON STORES: ICD-10-CM

## 2022-01-06 LAB
BASOPHILS # BLD: 0 K/UL (ref 0–0.2)
BASOPHILS NFR BLD: 0 % (ref 0–2)
DIFFERENTIAL METHOD BLD: ABNORMAL
EOSINOPHIL # BLD: 0.2 K/UL (ref 0–0.8)
EOSINOPHIL NFR BLD: 2 % (ref 0.5–7.8)
ERYTHROCYTE [DISTWIDTH] IN BLOOD BY AUTOMATED COUNT: 15 % (ref 11.9–14.6)
HCT VFR BLD AUTO: 37 % (ref 35.8–46.3)
HGB BLD-MCNC: 11.4 G/DL (ref 11.7–15.4)
IMM GRANULOCYTES # BLD AUTO: 0.1 K/UL (ref 0–0.5)
IMM GRANULOCYTES NFR BLD AUTO: 1 % (ref 0–5)
LYMPHOCYTES # BLD: 2.7 K/UL (ref 0.5–4.6)
LYMPHOCYTES NFR BLD: 29 % (ref 13–44)
MCH RBC QN AUTO: 25.9 PG (ref 26.1–32.9)
MCHC RBC AUTO-ENTMCNC: 30.8 G/DL (ref 31.4–35)
MCV RBC AUTO: 83.9 FL (ref 79.6–97.8)
MONOCYTES # BLD: 0.9 K/UL (ref 0.1–1.3)
MONOCYTES NFR BLD: 9 % (ref 4–12)
NEUTS SEG # BLD: 5.6 K/UL (ref 1.7–8.2)
NEUTS SEG NFR BLD: 59 % (ref 43–78)
NRBC # BLD: 0 K/UL (ref 0–0.2)
PLATELET # BLD AUTO: 318 K/UL (ref 150–450)
PMV BLD AUTO: 10.1 FL (ref 9.4–12.3)
RBC # BLD AUTO: 4.41 M/UL (ref 4.05–5.2)
WBC # BLD AUTO: 9.5 K/UL (ref 4.3–11.1)

## 2022-01-06 PROCEDURE — 85025 COMPLETE CBC W/AUTO DIFF WBC: CPT

## 2022-01-06 PROCEDURE — 36415 COLL VENOUS BLD VENIPUNCTURE: CPT

## 2022-02-28 ENCOUNTER — HOSPITAL ENCOUNTER (OUTPATIENT)
Dept: LAB | Age: 42
Discharge: HOME | End: 2022-02-28
Payer: COMMERCIAL

## 2022-02-28 DIAGNOSIS — R79.0 LOW IRON STORES: ICD-10-CM

## 2022-02-28 LAB
ALBUMIN SERPL-MCNC: 3.6 G/DL (ref 3.5–5)
ALBUMIN/GLOB SERPL: 1 {RATIO} (ref 1.2–3.5)
ALP SERPL-CCNC: 112 U/L (ref 50–136)
ALT SERPL-CCNC: 31 U/L (ref 12–65)
ANION GAP SERPL CALC-SCNC: 5 MMOL/L (ref 7–16)
AST SERPL-CCNC: 15 U/L (ref 15–37)
BASOPHILS # BLD: 0 K/UL (ref 0–0.2)
BASOPHILS NFR BLD: 0 % (ref 0–2)
BILIRUB SERPL-MCNC: 0.2 MG/DL (ref 0.2–1.1)
BUN SERPL-MCNC: 23 MG/DL (ref 6–23)
CALCIUM SERPL-MCNC: 9 MG/DL (ref 8.3–10.4)
CHLORIDE SERPL-SCNC: 104 MMOL/L (ref 98–107)
CO2 SERPL-SCNC: 25 MMOL/L (ref 21–32)
CREAT SERPL-MCNC: 1.4 MG/DL (ref 0.6–1)
DIFFERENTIAL METHOD BLD: ABNORMAL
EOSINOPHIL # BLD: 0.1 K/UL (ref 0–0.8)
EOSINOPHIL NFR BLD: 2 % (ref 0.5–7.8)
ERYTHROCYTE [DISTWIDTH] IN BLOOD BY AUTOMATED COUNT: 17.6 % (ref 11.9–14.6)
FERRITIN SERPL-MCNC: 35 NG/ML (ref 8–388)
GLOBULIN SER CALC-MCNC: 3.7 G/DL (ref 2.3–3.5)
GLUCOSE SERPL-MCNC: 395 MG/DL (ref 65–100)
HCT VFR BLD AUTO: 43.4 % (ref 35.8–46.3)
HGB BLD-MCNC: 13.7 G/DL (ref 11.7–15.4)
IMM GRANULOCYTES # BLD AUTO: 0.1 K/UL (ref 0–0.5)
IMM GRANULOCYTES NFR BLD AUTO: 1 % (ref 0–5)
IRON SATN MFR SERPL: 22 %
IRON SERPL-MCNC: 76 UG/DL (ref 35–150)
LYMPHOCYTES # BLD: 2.8 K/UL (ref 0.5–4.6)
LYMPHOCYTES NFR BLD: 30 % (ref 13–44)
MCH RBC QN AUTO: 26.3 PG (ref 26.1–32.9)
MCHC RBC AUTO-ENTMCNC: 31.6 G/DL (ref 31.4–35)
MCV RBC AUTO: 83.3 FL (ref 79.6–97.8)
MONOCYTES # BLD: 0.7 K/UL (ref 0.1–1.3)
MONOCYTES NFR BLD: 8 % (ref 4–12)
NEUTS SEG # BLD: 5.4 K/UL (ref 1.7–8.2)
NEUTS SEG NFR BLD: 59 % (ref 43–78)
NRBC # BLD: 0 K/UL (ref 0–0.2)
PLATELET # BLD AUTO: 290 K/UL (ref 150–450)
PMV BLD AUTO: 9.9 FL (ref 9.4–12.3)
POTASSIUM SERPL-SCNC: 4.5 MMOL/L (ref 3.5–5.1)
PROT SERPL-MCNC: 7.3 G/DL (ref 6.3–8.2)
RBC # BLD AUTO: 5.21 M/UL (ref 4.05–5.2)
SODIUM SERPL-SCNC: 134 MMOL/L (ref 136–145)
TIBC SERPL-MCNC: 347 UG/DL (ref 250–450)
WBC # BLD AUTO: 9.1 K/UL (ref 4.3–11.1)

## 2022-02-28 PROCEDURE — 83540 ASSAY OF IRON: CPT

## 2022-02-28 PROCEDURE — 80053 COMPREHEN METABOLIC PANEL: CPT

## 2022-02-28 PROCEDURE — 82728 ASSAY OF FERRITIN: CPT

## 2022-02-28 PROCEDURE — 85025 COMPLETE CBC W/AUTO DIFF WBC: CPT

## 2022-02-28 PROCEDURE — 36415 COLL VENOUS BLD VENIPUNCTURE: CPT

## 2022-03-18 PROBLEM — E78.2 MIXED HYPERLIPIDEMIA: Status: ACTIVE | Noted: 2018-04-11

## 2022-03-19 PROBLEM — E11.65 UNCONTROLLED TYPE 2 DIABETES MELLITUS WITH HYPERGLYCEMIA, WITHOUT LONG-TERM CURRENT USE OF INSULIN (HCC): Status: ACTIVE | Noted: 2021-05-14

## 2022-03-29 VITALS — WEIGHT: 276 LBS | BODY MASS INDEX: 39.51 KG/M2 | HEIGHT: 70 IN

## 2022-04-01 ENCOUNTER — HOSPITAL ENCOUNTER (OUTPATIENT)
Dept: SURGERY | Age: 42
Discharge: HOME | End: 2022-04-01

## 2022-04-07 ENCOUNTER — ANESTHESIA EVENT (OUTPATIENT)
Dept: SURGERY | Age: 42
End: 2022-04-07
Payer: COMMERCIAL

## 2022-04-07 NOTE — PERIOP NOTES
[address] [facility] [facility] Phone Call (performed day before scheduled surgery):    1. Have you have any exposure to anyone who has tested positive for COVID19 in the last 7 days? Patient Response: no      2.    Have you experienced any of the below symptoms in the last 48 hours?      -New onset respiratory symptoms                  -Fever or chills                  -Cough / Congestion / running nose                  -Shortness of breath or difficulty breathing                  -Headache                 -Sore Throat                 -New loss or taste or smell                 -Nausea / Vomiting / Diarrhea           Patient Response: no    Comments: no

## 2022-04-08 ENCOUNTER — ANESTHESIA (OUTPATIENT)
Dept: SURGERY | Age: 42
End: 2022-04-08
Payer: COMMERCIAL

## 2022-04-08 ENCOUNTER — HOSPITAL ENCOUNTER (OUTPATIENT)
Age: 42
Setting detail: OUTPATIENT SURGERY
Discharge: HOME | End: 2022-04-08
Attending: OBSTETRICS & GYNECOLOGY | Admitting: OBSTETRICS & GYNECOLOGY
Payer: COMMERCIAL

## 2022-04-08 VITALS
HEIGHT: 70 IN | HEART RATE: 81 BPM | BODY MASS INDEX: 38.65 KG/M2 | WEIGHT: 270 LBS | OXYGEN SATURATION: 100 % | RESPIRATION RATE: 16 BRPM | TEMPERATURE: 97.2 F | SYSTOLIC BLOOD PRESSURE: 132 MMHG | DIASTOLIC BLOOD PRESSURE: 62 MMHG

## 2022-04-08 DIAGNOSIS — G89.18 POSTOPERATIVE PAIN: Primary | ICD-10-CM

## 2022-04-08 DIAGNOSIS — N92.1 MENOMETRORRHAGIA: ICD-10-CM

## 2022-04-08 DIAGNOSIS — R93.5 ABNORMAL ULTRASOUND OF UTERUS: ICD-10-CM

## 2022-04-08 LAB
GLUCOSE BLD STRIP.AUTO-MCNC: 244 MG/DL (ref 65–100)
GLUCOSE BLD STRIP.AUTO-MCNC: 292 MG/DL (ref 65–100)
GLUCOSE BLD STRIP.AUTO-MCNC: 302 MG/DL (ref 65–100)
HCG UR QL: NEGATIVE
SERVICE CMNT-IMP: ABNORMAL

## 2022-04-08 PROCEDURE — 82962 GLUCOSE BLOOD TEST: CPT

## 2022-04-08 PROCEDURE — 2709999900 HC NON-CHARGEABLE SUPPLY: Performed by: OBSTETRICS & GYNECOLOGY

## 2022-04-08 PROCEDURE — 88305 TISSUE EXAM BY PATHOLOGIST: CPT

## 2022-04-08 PROCEDURE — 74011000250 HC RX REV CODE- 250: Performed by: ANESTHESIOLOGY

## 2022-04-08 PROCEDURE — 77030010509 HC AIRWY LMA MSK TELE -A: Performed by: ANESTHESIOLOGY

## 2022-04-08 PROCEDURE — 74011250636 HC RX REV CODE- 250/636: Performed by: ANESTHESIOLOGY

## 2022-04-08 PROCEDURE — 77030040361 HC SLV COMPR DVT MDII -B: Performed by: OBSTETRICS & GYNECOLOGY

## 2022-04-08 PROCEDURE — 76010000138 HC OR TIME 0.5 TO 1 HR: Performed by: OBSTETRICS & GYNECOLOGY

## 2022-04-08 PROCEDURE — 76210000006 HC OR PH I REC 0.5 TO 1 HR: Performed by: OBSTETRICS & GYNECOLOGY

## 2022-04-08 PROCEDURE — 74011636637 HC RX REV CODE- 636/637: Performed by: ANESTHESIOLOGY

## 2022-04-08 PROCEDURE — 74011000250 HC RX REV CODE- 250

## 2022-04-08 PROCEDURE — 58558 HYSTEROSCOPY BIOPSY: CPT | Performed by: OBSTETRICS & GYNECOLOGY

## 2022-04-08 PROCEDURE — 81025 URINE PREGNANCY TEST: CPT

## 2022-04-08 PROCEDURE — 74011250637 HC RX REV CODE- 250/637: Performed by: ANESTHESIOLOGY

## 2022-04-08 PROCEDURE — 76210000020 HC REC RM PH II FIRST 0.5 HR: Performed by: OBSTETRICS & GYNECOLOGY

## 2022-04-08 PROCEDURE — 76060000032 HC ANESTHESIA 0.5 TO 1 HR: Performed by: OBSTETRICS & GYNECOLOGY

## 2022-04-08 PROCEDURE — 74011250636 HC RX REV CODE- 250/636

## 2022-04-08 RX ORDER — PROCHLORPERAZINE EDISYLATE 5 MG/ML
2.5 INJECTION INTRAMUSCULAR; INTRAVENOUS
Status: DISCONTINUED | OUTPATIENT
Start: 2022-04-08 | End: 2022-04-08 | Stop reason: HOSPADM

## 2022-04-08 RX ORDER — LIDOCAINE HYDROCHLORIDE 20 MG/ML
INJECTION, SOLUTION EPIDURAL; INFILTRATION; INTRACAUDAL; PERINEURAL AS NEEDED
Status: DISCONTINUED | OUTPATIENT
Start: 2022-04-08 | End: 2022-04-08 | Stop reason: HOSPADM

## 2022-04-08 RX ORDER — IBUPROFEN 800 MG/1
800 TABLET ORAL
Qty: 100 TABLET | Refills: 2 | Status: SHIPPED | OUTPATIENT
Start: 2022-04-08

## 2022-04-08 RX ORDER — OXYCODONE HYDROCHLORIDE 5 MG/1
5 TABLET ORAL
Qty: 12 TABLET | Refills: 0 | Status: SHIPPED | OUTPATIENT
Start: 2022-04-08 | End: 2022-04-11

## 2022-04-08 RX ORDER — MIDAZOLAM HYDROCHLORIDE 1 MG/ML
2 INJECTION, SOLUTION INTRAMUSCULAR; INTRAVENOUS
Status: COMPLETED | OUTPATIENT
Start: 2022-04-08 | End: 2022-04-08

## 2022-04-08 RX ORDER — DEXAMETHASONE SODIUM PHOSPHATE 4 MG/ML
INJECTION, SOLUTION INTRA-ARTICULAR; INTRALESIONAL; INTRAMUSCULAR; INTRAVENOUS; SOFT TISSUE AS NEEDED
Status: DISCONTINUED | OUTPATIENT
Start: 2022-04-08 | End: 2022-04-08 | Stop reason: HOSPADM

## 2022-04-08 RX ORDER — FENTANYL CITRATE 50 UG/ML
INJECTION, SOLUTION INTRAMUSCULAR; INTRAVENOUS AS NEEDED
Status: DISCONTINUED | OUTPATIENT
Start: 2022-04-08 | End: 2022-04-08 | Stop reason: HOSPADM

## 2022-04-08 RX ORDER — HYDROMORPHONE HYDROCHLORIDE 2 MG/ML
0.5 INJECTION, SOLUTION INTRAMUSCULAR; INTRAVENOUS; SUBCUTANEOUS
Status: DISCONTINUED | OUTPATIENT
Start: 2022-04-08 | End: 2022-04-08 | Stop reason: HOSPADM

## 2022-04-08 RX ORDER — SODIUM CHLORIDE, SODIUM LACTATE, POTASSIUM CHLORIDE, CALCIUM CHLORIDE 600; 310; 30; 20 MG/100ML; MG/100ML; MG/100ML; MG/100ML
100 INJECTION, SOLUTION INTRAVENOUS CONTINUOUS
Status: DISCONTINUED | OUTPATIENT
Start: 2022-04-08 | End: 2022-04-08 | Stop reason: HOSPADM

## 2022-04-08 RX ORDER — ACETAMINOPHEN 500 MG
1000 TABLET ORAL ONCE
Status: COMPLETED | OUTPATIENT
Start: 2022-04-08 | End: 2022-04-08

## 2022-04-08 RX ORDER — PROPOFOL 10 MG/ML
INJECTION, EMULSION INTRAVENOUS AS NEEDED
Status: DISCONTINUED | OUTPATIENT
Start: 2022-04-08 | End: 2022-04-08 | Stop reason: HOSPADM

## 2022-04-08 RX ORDER — LIDOCAINE HYDROCHLORIDE 10 MG/ML
0.3 INJECTION INFILTRATION; PERINEURAL ONCE
Status: COMPLETED | OUTPATIENT
Start: 2022-04-08 | End: 2022-04-08

## 2022-04-08 RX ORDER — OXYCODONE HYDROCHLORIDE 5 MG/1
10 TABLET ORAL
Status: COMPLETED | OUTPATIENT
Start: 2022-04-08 | End: 2022-04-08

## 2022-04-08 RX ORDER — ONDANSETRON 2 MG/ML
INJECTION INTRAMUSCULAR; INTRAVENOUS AS NEEDED
Status: DISCONTINUED | OUTPATIENT
Start: 2022-04-08 | End: 2022-04-08 | Stop reason: HOSPADM

## 2022-04-08 RX ORDER — KETOROLAC TROMETHAMINE 30 MG/ML
INJECTION, SOLUTION INTRAMUSCULAR; INTRAVENOUS AS NEEDED
Status: DISCONTINUED | OUTPATIENT
Start: 2022-04-08 | End: 2022-04-08 | Stop reason: HOSPADM

## 2022-04-08 RX ORDER — INSULIN LISPRO 100 [IU]/ML
12 INJECTION, SOLUTION INTRAVENOUS; SUBCUTANEOUS
Status: COMPLETED | OUTPATIENT
Start: 2022-04-08 | End: 2022-04-08

## 2022-04-08 RX ADMIN — INSULIN LISPRO 12 UNITS: 100 INJECTION, SOLUTION INTRAVENOUS; SUBCUTANEOUS at 07:16

## 2022-04-08 RX ADMIN — OXYCODONE 10 MG: 5 TABLET ORAL at 09:15

## 2022-04-08 RX ADMIN — MIDAZOLAM 2 MG: 1 INJECTION INTRAMUSCULAR; INTRAVENOUS at 07:50

## 2022-04-08 RX ADMIN — LIDOCAINE HYDROCHLORIDE 80 MG: 20 INJECTION, SOLUTION EPIDURAL; INFILTRATION; INTRACAUDAL; PERINEURAL at 08:05

## 2022-04-08 RX ADMIN — FENTANYL CITRATE 100 MCG: 50 INJECTION INTRAMUSCULAR; INTRAVENOUS at 08:01

## 2022-04-08 RX ADMIN — KETOROLAC TROMETHAMINE 30 MG: 30 INJECTION, SOLUTION INTRAMUSCULAR; INTRAVENOUS at 08:43

## 2022-04-08 RX ADMIN — ONDANSETRON 4 MG: 2 INJECTION INTRAMUSCULAR; INTRAVENOUS at 08:16

## 2022-04-08 RX ADMIN — ACETAMINOPHEN 1000 MG: 500 TABLET, FILM COATED ORAL at 06:54

## 2022-04-08 RX ADMIN — PROPOFOL 200 MG: 10 INJECTION, EMULSION INTRAVENOUS at 08:05

## 2022-04-08 RX ADMIN — LIDOCAINE HYDROCHLORIDE 0.3 ML: 10 INJECTION, SOLUTION INFILTRATION; PERINEURAL at 07:00

## 2022-04-08 RX ADMIN — DEXAMETHASONE SODIUM PHOSPHATE 8 MG: 4 INJECTION, SOLUTION INTRAMUSCULAR; INTRAVENOUS at 08:16

## 2022-04-08 RX ADMIN — SODIUM CHLORIDE, SODIUM LACTATE, POTASSIUM CHLORIDE, AND CALCIUM CHLORIDE 100 ML/HR: 600; 310; 30; 20 INJECTION, SOLUTION INTRAVENOUS at 07:16

## 2022-04-08 NOTE — ANESTHESIA POSTPROCEDURE EVALUATION
Procedure(s):  DILATATION AND CURETTAGE HYSTEROSCOPY. general    Anesthesia Post Evaluation      Multimodal analgesia: multimodal analgesia used between 6 hours prior to anesthesia start to PACU discharge  Patient location during evaluation: PACU  Patient participation: complete - patient participated  Level of consciousness: awake and alert  Pain management: adequate  Airway patency: patent  Anesthetic complications: no  Cardiovascular status: acceptable  Respiratory status: acceptable  Hydration status: acceptable  Post anesthesia nausea and vomiting:  none      INITIAL Post-op Vital signs:   Vitals Value Taken Time   /80 04/08/22 0925   Temp 36.2 °C (97.2 °F) 04/08/22 0855   Pulse 76 04/08/22 0930   Resp 16 04/08/22 0925   SpO2 99 % 04/08/22 0930   Vitals shown include unvalidated device data.

## 2022-04-08 NOTE — OP NOTES
HYSTEROSCOPY / D&C    PATIENT NAME: Shirlene Frances  MRN: [id number]    DATE OF SURGERY: 4/8/2022    PREOPERATIVE DIAGNOSES: Menorrhagia with regular cycle [N92.0]  Endometrial polyp [N84.0]    POSTOPERATIVE DIAGNOSES: Menorrhagia with regular cycle [N92.0]  Endometrial polyp [N84.0]    PROCEDURE PERFORMED: Procedure(s):  DILATATION AND CURETTAGE HYSTEROSCOPY  Procedure(s):  DILATATION AND CURETTAGE HYSTEROSCOPY    SURGEON: Pipe Hilario MD    ANESTHESIA: General    ASSISTANT: Jonathan Arriola    SPECIMEN: endometrial biopsy    ESTIMATED BLOOD LOSS: Minimal    COMPLICATIONS: none    FINDINGS: no obvious polyp; some slight irregularity of the lining with small tortuous areas of the lining    The patient was taken to the operating room. She was given anesthesia. Time out was performed. She was placed in the dorsal lithotomy. She was prepped and draped in routine fashion. Weighted speculum was placed in the vagina. The cervix was grasped. Sound was introduced into cervix and depth was 10 cm. Cervix was dilated and diagnostic scope was introduced. Uterine cavity was examined with the above findings. Both ostia were identified. Sharp curettage was performed and specimens sent to pathology. Instruments were removed from the cervix and vagina. Blood loss was minimal.  She was returned to supine position. She was awakened in the OR and taken to PACU in stable condition.      Pipe Hilario MD  4/8/2022

## 2022-04-08 NOTE — ANESTHESIA PREPROCEDURE EVALUATION
Relevant Problems   ENDOCRINE   (+) Uncontrolled type 2 diabetes mellitus with hyperglycemia, without long-term current use of insulin (HCC)       Anesthetic History   No history of anesthetic complications            Review of Systems / Medical History  Patient summary reviewed and pertinent labs reviewed    Pulmonary  Within defined limits                 Neuro/Psych   Within defined limits           Cardiovascular                  Exercise tolerance: >4 METS     GI/Hepatic/Renal             Pertinent negatives: No GERD   Endo/Other    Diabetes (last A1C 7.2 but recently Glc 200+): type 2, using insulin    Obesity     Other Findings   Comments: PCOS           Physical Exam    Airway  Mallampati: I  TM Distance: 4 - 6 cm  Neck ROM: normal range of motion   Mouth opening: Normal     Cardiovascular    Rhythm: regular  Rate: normal         Dental  No notable dental hx       Pulmonary  Breath sounds clear to auscultation               Abdominal         Other Findings            Anesthetic Plan    ASA: 3  Anesthesia type: general          Induction: Intravenous  Anesthetic plan and risks discussed with: Patient and Spouse

## 2022-04-08 NOTE — PROGRESS NOTES
Arrival time 7:37. Have seen patient. Consents signed. H&P updated. Awaiting OR staff.      Alec Marshall MD  7:49 AM

## 2022-05-23 DIAGNOSIS — D50.9 IRON DEFICIENCY ANEMIA, UNSPECIFIED IRON DEFICIENCY ANEMIA TYPE: Primary | ICD-10-CM

## 2022-05-24 ENCOUNTER — HOSPITAL ENCOUNTER (OUTPATIENT)
Dept: LAB | Age: 42
Discharge: HOME | End: 2022-05-27
Payer: COMMERCIAL

## 2022-05-24 ENCOUNTER — OFFICE VISIT (OUTPATIENT)
Dept: ONCOLOGY | Age: 42
End: 2022-05-24
Payer: COMMERCIAL

## 2022-05-24 VITALS
OXYGEN SATURATION: 99 % | HEART RATE: 96 BPM | RESPIRATION RATE: 14 BRPM | BODY MASS INDEX: 38.08 KG/M2 | SYSTOLIC BLOOD PRESSURE: 117 MMHG | DIASTOLIC BLOOD PRESSURE: 75 MMHG | TEMPERATURE: 98 F | WEIGHT: 266 LBS | HEIGHT: 70 IN

## 2022-05-24 DIAGNOSIS — D50.9 IRON DEFICIENCY ANEMIA, UNSPECIFIED IRON DEFICIENCY ANEMIA TYPE: Primary | ICD-10-CM

## 2022-05-24 DIAGNOSIS — D50.9 IRON DEFICIENCY ANEMIA, UNSPECIFIED IRON DEFICIENCY ANEMIA TYPE: ICD-10-CM

## 2022-05-24 LAB
ALBUMIN SERPL-MCNC: 3.9 G/DL (ref 3.5–5)
ALBUMIN/GLOB SERPL: 1.3 {RATIO} (ref 1.2–3.5)
ALP SERPL-CCNC: 85 U/L (ref 50–136)
ALT SERPL-CCNC: 27 U/L (ref 12–65)
ANION GAP SERPL CALC-SCNC: 8 MMOL/L (ref 7–16)
AST SERPL-CCNC: 18 U/L (ref 15–37)
BASOPHILS # BLD: 0 K/UL (ref 0–0.2)
BASOPHILS NFR BLD: 0 % (ref 0–2)
BILIRUB SERPL-MCNC: 0.5 MG/DL (ref 0.2–1.1)
BUN SERPL-MCNC: 19 MG/DL (ref 6–23)
CALCIUM SERPL-MCNC: 9 MG/DL (ref 8.3–10.4)
CHLORIDE SERPL-SCNC: 109 MMOL/L (ref 98–107)
CO2 SERPL-SCNC: 22 MMOL/L (ref 21–32)
CREAT SERPL-MCNC: 1.2 MG/DL (ref 0.6–1)
DIFFERENTIAL METHOD BLD: ABNORMAL
EOSINOPHIL # BLD: 0.2 K/UL (ref 0–0.8)
EOSINOPHIL NFR BLD: 2 % (ref 0.5–7.8)
ERYTHROCYTE [DISTWIDTH] IN BLOOD BY AUTOMATED COUNT: 14.9 % (ref 11.9–14.6)
FERRITIN SERPL-MCNC: 40 NG/ML (ref 8–388)
GLOBULIN SER CALC-MCNC: 3 G/DL (ref 2.3–3.5)
GLUCOSE SERPL-MCNC: 314 MG/DL (ref 65–100)
HCT VFR BLD AUTO: 40.8 % (ref 35.8–46.3)
HGB BLD-MCNC: 13.1 G/DL (ref 11.7–15.4)
IMM GRANULOCYTES # BLD AUTO: 0 K/UL (ref 0–0.5)
IMM GRANULOCYTES NFR BLD AUTO: 0 % (ref 0–5)
IRON SATN MFR SERPL: 26 %
IRON SERPL-MCNC: 96 UG/DL (ref 35–150)
LYMPHOCYTES # BLD: 2.4 K/UL (ref 0.5–4.6)
LYMPHOCYTES NFR BLD: 24 % (ref 13–44)
MCH RBC QN AUTO: 28.8 PG (ref 26.1–32.9)
MCHC RBC AUTO-ENTMCNC: 32.1 G/DL (ref 31.4–35)
MCV RBC AUTO: 89.7 FL (ref 79.6–97.8)
MONOCYTES # BLD: 0.6 K/UL (ref 0.1–1.3)
MONOCYTES NFR BLD: 6 % (ref 4–12)
NEUTS SEG # BLD: 6.6 K/UL (ref 1.7–8.2)
NEUTS SEG NFR BLD: 68 % (ref 43–78)
NRBC # BLD: 0 K/UL (ref 0–0.2)
PLATELET # BLD AUTO: 282 K/UL (ref 150–450)
PMV BLD AUTO: 10.3 FL (ref 9.4–12.3)
POTASSIUM SERPL-SCNC: 3.9 MMOL/L (ref 3.5–5.1)
PROT SERPL-MCNC: 6.9 G/DL (ref 6.3–8.2)
RBC # BLD AUTO: 4.55 M/UL (ref 4.05–5.2)
SODIUM SERPL-SCNC: 139 MMOL/L (ref 136–145)
TIBC SERPL-MCNC: 365 UG/DL (ref 250–450)
WBC # BLD AUTO: 9.8 K/UL (ref 4.3–11.1)

## 2022-05-24 PROCEDURE — 99214 OFFICE O/P EST MOD 30 MIN: CPT | Performed by: NURSE PRACTITIONER

## 2022-05-24 PROCEDURE — 85025 COMPLETE CBC W/AUTO DIFF WBC: CPT

## 2022-05-24 PROCEDURE — 82728 ASSAY OF FERRITIN: CPT

## 2022-05-24 PROCEDURE — 80053 COMPREHEN METABOLIC PANEL: CPT

## 2022-05-24 PROCEDURE — 83550 IRON BINDING TEST: CPT

## 2022-05-24 PROCEDURE — 36415 COLL VENOUS BLD VENIPUNCTURE: CPT

## 2022-05-24 RX ORDER — IBUPROFEN 800 MG/1
800 TABLET ORAL EVERY 6 HOURS PRN
COMMUNITY
Start: 2022-04-08

## 2022-05-24 RX ORDER — SEMAGLUTIDE 1.34 MG/ML
0.5 INJECTION, SOLUTION SUBCUTANEOUS
COMMUNITY
Start: 2022-02-04 | End: 2022-06-21 | Stop reason: SDUPTHER

## 2022-05-24 RX ORDER — MEDROXYPROGESTERONE ACETATE 150 MG/ML
INJECTION, SUSPENSION INTRAMUSCULAR
COMMUNITY
Start: 2022-05-21

## 2022-05-24 RX ORDER — INSULIN ASPART 100 [IU]/ML
INJECTION, SOLUTION INTRAVENOUS; SUBCUTANEOUS
COMMUNITY
Start: 2022-02-04 | End: 2022-06-21 | Stop reason: SDUPTHER

## 2022-05-24 RX ORDER — ATORVASTATIN CALCIUM 10 MG/1
10 TABLET, FILM COATED ORAL
COMMUNITY
Start: 2022-02-04 | End: 2022-06-21 | Stop reason: SDUPTHER

## 2022-05-24 ASSESSMENT — PATIENT HEALTH QUESTIONNAIRE - PHQ9
SUM OF ALL RESPONSES TO PHQ QUESTIONS 1-9: 0
2. FEELING DOWN, DEPRESSED OR HOPELESS: 0
SUM OF ALL RESPONSES TO PHQ QUESTIONS 1-9: 0
1. LITTLE INTEREST OR PLEASURE IN DOING THINGS: 0
SUM OF ALL RESPONSES TO PHQ9 QUESTIONS 1 & 2: 0

## 2022-05-24 NOTE — PROGRESS NOTES
[address] VISIT       Patient Name: Tayler Li          Date of Visit:  22    : 1980   Age: 37 y.o. Presenting Complaint:  Tayler Li  is seen in follow-up for iron deficiency. History of Present Illness:  Ms. Celestina Quintana was seen for the first time in our office in 2022. She was referred by Dr. Todd Sarkar for evaluation of iron deficiency. She was an otherwise healthy woman,  4 para 3 abortus  1 who had no significant prior medical history. Beginning in approximately 2021 she developed rather significant difficulties with menorrhagia. In fact, she indicated that she had nearly continuous menstrual flow from October through early December. At the time of her initial visit here, she was undergoing evaluation and work-up by Dr. Todd Sarkar for these difficulties. In the process of her evaluation, she underwent routine laboratory studies on . At that time, her hemoglobin was 11.4 hematocrit  37.0, and platelet count 003,285 with an MCV of 83.9. Her white count and differential were normal.  She also underwent iron studies notable for an iron of 32, transferrin of 361 and a saturation of 9% with a ferritin of 14. The patient indicates that  she is mildly fatigued. She has had no pica. She has breast-fed her children and wean her youngest child in 2021. She has had no difficulty with bleeding during other procedures or with loss of deciduous teeth. She is here today for follow up. She continues to feel well overall. She continues close follow up with GYN. She had a D&C 22 and remains on depo as well. She has had some intermittent spotting since that time. She will see GYN again for follow up tomorrow. She denies any other known bleeding. She denies any shortness of breath. She denies any ice cravings. Her energy level has improved overall since starting the oral iron.   She has no tolerance issues with taking the oral iron. Medications:   Current Outpatient Medications   Medication Sig Dispense Refill    atorvastatin (LIPITOR) 10 MG tablet Take 10 mg by mouth      glucagon 1 MG injection 1 mg as needed      ibuprofen (ADVIL;MOTRIN) 800 MG tablet Take 800 mg by mouth every 6 hours as needed      insulin aspart (NOVOLOG FLEXPEN) 100 UNIT/ML injection pen Correction scale /50>150 (up to 20 units daily)      medroxyPROGESTERone (DEPO-PROVERA) 150 MG/ML injection       metFORMIN (GLUCOPHAGE) 1000 MG tablet Take 1,000 mg by mouth 2 times daily (with meals)      Semaglutide,0.25 or 0.5MG/DOS, (OZEMPIC, 0.25 OR 0.5 MG/DOSE,) 2 MG/1.5ML SOPN Inject 0.5 mg into the skin every 7 days       No current facility-administered medications for this visit. Allergies: Allergies   Allergen Reactions    Shellfish-Derived Products Anaphylaxis          Review of Systems: The Review of Systems is documented in full in the internal medical record. All systems are negative other than for those noted above.       Past Medical History:  Past Medical History:   Diagnosis Date    BMI 39.0-39.9,adult     Controlled type 2 diabetes mellitus without complication, with long-term current use of insulin ([facility] [address] 2020    followed by Endocrinology=insulin prn; metformin daily; ozempic weekly; checks bs TID; average bs 120-140; A1C=7.4 on 22    Low iron stores     takes iron daily; followed by Hematology     Onychomycosis 2017    PCOD (polycystic ovarian disease)     Pure hypercholesterolemia 2018    on med for control         Past Surgical History:  Past Surgical History:   Procedure Laterality Date    APPENDECTOMY       SECTION      x3    CHOLECYSTECTOMY      DILATION AND CURETTAGE OF UTERUS  2022    LAPAROTOMY  age 15yo    with ovarian cystectomy         Social History:  Social History     Socioeconomic History    Marital status:      Spouse name: Not on file    Number of children: Not on file    Years of education: Not on file    Highest education level: Not on file   Occupational History    Not on file   Tobacco Use    Smoking status: Never Smoker    Smokeless tobacco: Never Used   Substance and Sexual Activity    Alcohol use: No     Alcohol/week: 0.0 standard drinks    Drug use: No    Sexual activity: Not on file   Other Topics Concern    Not on file   Social History Narrative    1. From [facility]. Last name pronounced \"See-or-olu\"     Social Determinants of Health     Financial Resource Strain:     Difficulty of Paying Living Expenses: Not on file   Food Insecurity:     Worried About [address] [employer] in the Last Year: Not on file    Vivi of Food in the Last Year: Not on file   Transportation Needs:     Lack of Transportation (Medical): Not on file    Lack of Transportation (Non-Medical):  Not on file   Physical Activity:     Days of Exercise per Week: Not on file    Minutes of Exercise per Session: Not on file   Stress:     Feeling of Stress : Not on file   Social Connections:     Frequency of Communication with Friends and Family: Not on file    Frequency of Social Gatherings with Friends and Family: Not on file    Attends Temple Services: Not on file    Active Member of [address] [place] [address] [employer] or Organizations: Not on file    Attends Club or Organization Meetings: Not on file    Marital Status: Not on file   Intimate Partner Violence:     Fear of Current or Ex-Partner: Not on file    Emotionally Abused: Not on file    Physically Abused: Not on file    Sexually Abused: Not on file   Housing Stability:     Unable to Pay for Housing in the Last Year: Not on file    Number of [place] in the Last Year: Not on file    Unstable Housing in the Last Year: Not on file         Family History:  Family History   Problem Relation Age of Onset    Diabetes Paternal Grandfather     Breast Cancer Neg Hx     Ovarian Cancer Neg Hx     Prostate Cancer Neg Hx     Deep Vein Thrombosis Neg Hx     Diabetes Paternal Grandmother     Hypertension Neg Hx     Colon Cancer Paternal Uncle     Diabetes Maternal Grandmother     Diabetes Maternal Grandfather     Pulmonary Embolism Neg Hx           Physical Examination:  General Appearance: Healthy appearing patient in no acute distress. Visit Vitals  Vitals:    05/24/22 1453   BP: 117/75   Site: Right Upper Arm   Position: Sitting   Cuff Size: Large Adult   Pulse: 96   Resp: 14   Temp: 98 °F (36.7 °C)   TempSrc: Oral   SpO2: 99%   Weight: 266 lb (120.7 kg)   Height: 5' 10\" (1.778 m)        Performance Status: ECOG Level  0   Distress Screening Score:  PHQ-9  5/24/2022 4/25/2022 2/28/2022   Little interest or pleasure in doing things 0 - -   Little interest or pleasure in doing things - 0 0   Feeling down, depressed, or hopeless 0 - -   PHQ-2 Score 0 - -   Total Score PHQ 2 - 0 0   PHQ-9 Total Score 0 - -        Pain Scale: 0 - No pain/10         HEENT: No oral exam performed. Neck: Supple. There is no thyromegaly. Lymph nodes: Deferred. Breasts: Not examined   Lungs: The lungs are clear to auscultation and percussion. There is no egophony. There is no chest wall tenderness and no  use of accessory respiratory musculature. Heart: There is no jugular venous distention. The rate is normal and rhythm regular. The S1 and S2 are normal and there are no murmurs or rubs. Abdomen: Soft, non-tender, bowel sounds present and normal, no appreciated hepatosplenomegaly. No palpable masses. Skin: No rash, petechiae or ecchymoses. No evidence of malignancy. Extremities: No cyanosis, clubbing or edema.          Labs/Imaging:  Hospital Outpatient Visit on 05/24/2022   Component Date Value Ref Range Status    WBC 05/24/2022 9.8  4.3 - 11.1 K/uL Final    RBC 05/24/2022 4.55  4.05 - 5.2 M/uL Final    Hemoglobin 05/24/2022 13.1  11.7 - 15.4 g/dL Final    Hematocrit 05/24/2022 40.8  35.8 - 46.3 % Final    MCV 05/24/2022 89.7  79.6 - 97.8 FL Final    MCH 05/24/2022 28.8  26.1 - 32.9 PG Final    MCHC 05/24/2022 32.1  31.4 - 35.0 g/dL Final    RDW 05/24/2022 14.9* 11.9 - 14.6 % Final    Platelets 15/94/0421 282  150 - 450 K/uL Final    MPV 05/24/2022 10.3  9.4 - 12.3 FL Final    nRBC 05/24/2022 0.00  0.0 - 0.2 K/uL Final    **Note: Absolute NRBC parameter is now reported with Hemogram**    Differential Type 05/24/2022 AUTOMATED    Final    Seg Neutrophils 05/24/2022 68  43 - 78 % Final    Lymphocytes 05/24/2022 24  13 - 44 % Final    Monocytes 05/24/2022 6  4.0 - 12.0 % Final    Eosinophils % 05/24/2022 2  0.5 - 7.8 % Final    Basophils 05/24/2022 0  0.0 - 2.0 % Final    Immature Granulocytes 05/24/2022 0  0.0 - 5.0 % Final    Segs Absolute 05/24/2022 6.6  1.7 - 8.2 K/UL Final    Absolute Lymph # 05/24/2022 2.4  0.5 - 4.6 K/UL Final    Absolute Mono # 05/24/2022 0.6  0.1 - 1.3 K/UL Final    Absolute Eos # 05/24/2022 0.2  0.0 - 0.8 K/UL Final    Basophils Absolute 05/24/2022 0.0  0.0 - 0.2 K/UL Final    Absolute Immature Granulocyte 05/24/2022 0.0  0.0 - 0.5 K/UL Final    Sodium 05/24/2022 139  136 - 145 mmol/L Final    Potassium 05/24/2022 3.9  3.5 - 5.1 mmol/L Final    Chloride 05/24/2022 109* 98 - 107 mmol/L Final    CO2 05/24/2022 22  21 - 32 mmol/L Final    Anion Gap 05/24/2022 8  7 - 16 mmol/L Final    Glucose 05/24/2022 314* 65 - 100 mg/dL Final    BUN 05/24/2022 19  6 - 23 MG/DL Final    CREATININE 05/24/2022 1.20* 0.6 - 1.0 MG/DL Final    GFR  05/24/2022 >60  >60 ml/min/1.73m2 Final    GFR Non- 05/24/2022 53* >60 ml/min/1.73m2 Final    Comment:      Estimated GFR is calculated using the Modification of Diet in Renal Disease (MDRD) Study equation, reported for both  Americans (GFRAA) and non- Americans (GFRNA), and normalized to 1.73m2 body surface area. The physician must decide which value applies to the patient.   The MDRD study equation should only be used in individuals age 25 or older. It has not been validated for the following: pregnant women, patients with serious comorbid conditions,or on certain medications, or persons with extremes of body size, muscle mass, or nutritional status.  Calcium 05/24/2022 9.0  8.3 - 10.4 MG/DL Final    Total Bilirubin 05/24/2022 0.5  0.2 - 1.1 MG/DL Final    ALT 05/24/2022 27  12 - 65 U/L Final    AST 05/24/2022 18  15 - 37 U/L Final    Alk Phosphatase 05/24/2022 85  50 - 136 U/L Final    Total Protein 05/24/2022 6.9  6.3 - 8.2 g/dL Final    Albumin 05/24/2022 3.9  3.5 - 5.0 g/dL Final    Globulin 05/24/2022 3.0  2.3 - 3.5 g/dL Final    Albumin/Globulin Ratio 05/24/2022 1.3  1.2 - 3.5   Final    Ferritin 05/24/2022 40  8 - 388 NG/ML Final    Iron 05/24/2022 96  35 - 150 ug/dL Final    Comment: Known Interfering Substances section:  \"Iron values may be falsely elevated in  serum samples from patients with  anticoagulants (e.g., hemodialysis patients). \"  Limitations of Procedure section:  \"Turbidity resulting from precipitation of  fibrinogen in the serum of patients treated  with anticoagulants (e.g. hemodialysis  patients) may cause spuriously elevated  iron results. \"      TIBC 05/24/2022 365  250 - 450 ug/dL Final    TRANSFERRIN SATURATION 05/24/2022 26  >20 % Final            ASSESSMENT:  Ms. Octavio Crystal has had menorrhagia currently undergoing evaluation. In the process of her evaluation, she was found to have iron deficiency without a significant anemia. The etiology for her iron deficiency  is likely due to the menorrhagia perhaps with superimposed demands of lactation. She chose to proceed with oral supplementation and has been taking ferrous sulfate twice a day since her last visit. With this, she has had a rise in hemoglobin and her ferritin level has risen to 40. She had a D&C 4/8/2022 with gyn and is on depo. PLAN:  She will remain on ferrous sulfate twice a day.   Will schedule her to see Dr. Germain All again in 3 months. Ferritin slightly improved at 40 but not yet at goal, will continue with oral iron at current dosing for now. Pt is f/u with GYN tomorrow and still has intermittent spotting post previous D&C in April.                RESUSCITATION DIRECTIVES/HOSPICE CARE;   Full Support                    GONZALEZ Packer  [facility] Hematology [facility] On[facility]  [address] [place] [address]  Office : [phone]  Fax : [phone]

## 2022-05-25 ENCOUNTER — OFFICE VISIT (OUTPATIENT)
Dept: OBGYN CLINIC | Age: 42
End: 2022-05-25
Payer: COMMERCIAL

## 2022-05-25 VITALS
DIASTOLIC BLOOD PRESSURE: 70 MMHG | BODY MASS INDEX: 37.94 KG/M2 | SYSTOLIC BLOOD PRESSURE: 118 MMHG | HEIGHT: 70 IN | WEIGHT: 265 LBS

## 2022-05-25 DIAGNOSIS — N92.6 IRREGULAR MENSES: Primary | ICD-10-CM

## 2022-05-25 PROCEDURE — 99213 OFFICE O/P EST LOW 20 MIN: CPT | Performed by: OBSTETRICS & GYNECOLOGY

## 2022-05-26 DIAGNOSIS — E11.65 UNCONTROLLED TYPE 2 DIABETES MELLITUS WITH HYPERGLYCEMIA, WITHOUT LONG-TERM CURRENT USE OF INSULIN (HCC): ICD-10-CM

## 2022-05-26 DIAGNOSIS — E78.2 MIXED HYPERLIPIDEMIA: Primary | ICD-10-CM

## 2022-05-27 NOTE — PROGRESS NOTES
Name: Vamshi Cottrell YOB: 1980    DATE: 5/25/2022  Age: 37 y.o. Subjective:     Chief Complaint:    Chief Complaint   Patient presents with    Follow-up       HPI: Jomar Hogan is here for recheck on irregular bleeding. She states bleeding is much improved. She will occasionally spot. She is currently on depoprovera and due for another injection in June. LMP: No LMP recorded. Patient has had an injection. Birth Control / Sexual History:    Social History     Substance and Sexual Activity   Sexual Activity Not on file       Allergies: Allergies   Allergen Reactions    Shellfish-Derived Products Anaphylaxis     Medications:    Current Outpatient Medications on File Prior to Visit   Medication Sig Dispense Refill    atorvastatin (LIPITOR) 10 MG tablet Take 10 mg by mouth      ibuprofen (ADVIL;MOTRIN) 800 MG tablet Take 800 mg by mouth every 6 hours as needed      insulin aspart (NOVOLOG FLEXPEN) 100 UNIT/ML injection pen Correction scale 1/50>150 (up to 20 units daily)      medroxyPROGESTERone (DEPO-PROVERA) 150 MG/ML injection       metFORMIN (GLUCOPHAGE) 1000 MG tablet Take 1,000 mg by mouth 2 times daily (with meals)      Semaglutide,0.25 or 0.5MG/DOS, (OZEMPIC, 0.25 OR 0.5 MG/DOSE,) 2 MG/1.5ML SOPN Inject 0.5 mg into the skin every 7 days       No current facility-administered medications on file prior to visit.          Review of Systems:     All of the below were reviewed and all positives are noted in the hpi  Constitutional:   Unexplained weight gain or loss ; heat/cold intolerance ; loss of balance  Ear, Nose, Throat[de-identified]  Blurred vision ; loss of hearing ; Hoarseness  Cardiovascular:  Chest pain ; shortness of breath when lying flat ; swelling of legs or feet  Respiratory:  Shortness of breath ; prolonged cough ; coughing of blood  GI:  Diarrhea > 2 weeks ; rectal bleeding ; tar or black colored stools ; heartburn ; nausea  Skin:  Persistent rash ; change in moles  Musculoskeletal / Neuro:  Joint pain ; muscle weakness ; seizures ; headaches  Psychiatric:  Depression ([place]) ; crying spells ; anxiety ; panic attacks  Hematology / Lymph:  Easy bruising ; frequent nose bleeds ; swollen lymph nodes    Gynecologic:  Bleeding or spotting between periods ; periods > 7 days ; pain with sex                            Severe menstrual cramps ; bleeding after sex ; vaginal discharge  Breast:  Spontaneous nipple discharge ; Masses ; Pain  :  Blood in urine ; pain with urination ; leakage of urine ; nocturia      Objective:     Vitals:    05/25/22 1548   BP: 118/70   Weight: 265 lb (120.2 kg)   Height: 5' 10\" (1.778 m)      Body mass index is 38.02 kg/m². PHYSICAL EXAM   General   WDWN, No Acute Distress; obese   Psych   Normal judgement/thought/affect   Neuro   Grossly normal   HEENT   Normocephalic; atraumatic; no scleral icterus   Chest   Normal respiratory effort   Abdomen   Soft, Non-distended; spleen and liver normal   Extremities   No significant edema   Vulva   Normal labia, no external lesions or rashes   Urogenital   Normal appearing urethral meatus/bladder   Vagina   normal   Cervix   No lesions   Uterus   No masses, nt   Adnexa   No masses,nt         Assessment/Plan:     Encounter Diagnoses   Name Primary?  Irregular menses Yes       Plan:  · Doing well with her current treatment. Recommend continue with depoprovera due in June.        Signed By:  Alejandrina Kirby MD

## 2022-06-16 DIAGNOSIS — E11.65 UNCONTROLLED TYPE 2 DIABETES MELLITUS WITH HYPERGLYCEMIA, WITHOUT LONG-TERM CURRENT USE OF INSULIN (HCC): ICD-10-CM

## 2022-06-16 DIAGNOSIS — E78.2 MIXED HYPERLIPIDEMIA: ICD-10-CM

## 2022-06-17 LAB
ALBUMIN SERPL-MCNC: 3.9 G/DL (ref 3.5–5)
ALBUMIN/GLOB SERPL: 1.2 {RATIO} (ref 1.2–3.5)
ALP SERPL-CCNC: 100 U/L (ref 50–136)
ALT SERPL-CCNC: 26 U/L (ref 12–65)
ANION GAP SERPL CALC-SCNC: 9 MMOL/L (ref 7–16)
AST SERPL-CCNC: 14 U/L (ref 15–37)
BILIRUB SERPL-MCNC: 0.4 MG/DL (ref 0.2–1.1)
BUN SERPL-MCNC: 22 MG/DL (ref 6–23)
CALCIUM SERPL-MCNC: 9.2 MG/DL (ref 8.3–10.4)
CHLORIDE SERPL-SCNC: 109 MMOL/L (ref 98–107)
CHOLEST SERPL-MCNC: 182 MG/DL
CO2 SERPL-SCNC: 24 MMOL/L (ref 21–32)
CREAT SERPL-MCNC: 0.9 MG/DL (ref 0.6–1)
EST. AVERAGE GLUCOSE BLD GHB EST-MCNC: 192 MG/DL
GLOBULIN SER CALC-MCNC: 3.3 G/DL (ref 2.3–3.5)
GLUCOSE SERPL-MCNC: 201 MG/DL (ref 65–100)
HBA1C MFR BLD: 8.3 % (ref 4.2–6.3)
HDLC SERPL-MCNC: 46 MG/DL (ref 40–60)
HDLC SERPL: 4 {RATIO}
LDLC SERPL CALC-MCNC: 113 MG/DL
POTASSIUM SERPL-SCNC: 4.6 MMOL/L (ref 3.5–5.1)
PROT SERPL-MCNC: 7.2 G/DL (ref 6.3–8.2)
SODIUM SERPL-SCNC: 142 MMOL/L (ref 136–145)
TRIGL SERPL-MCNC: 115 MG/DL (ref 35–150)
VLDLC SERPL CALC-MCNC: 23 MG/DL (ref 6–23)

## 2022-06-21 ENCOUNTER — OFFICE VISIT (OUTPATIENT)
Dept: ENDOCRINOLOGY | Age: 42
End: 2022-06-21
Payer: COMMERCIAL

## 2022-06-21 VITALS
HEIGHT: 70 IN | WEIGHT: 264 LBS | DIASTOLIC BLOOD PRESSURE: 70 MMHG | HEART RATE: 87 BPM | OXYGEN SATURATION: 98 % | SYSTOLIC BLOOD PRESSURE: 130 MMHG | BODY MASS INDEX: 37.8 KG/M2

## 2022-06-21 DIAGNOSIS — E11.65 UNCONTROLLED TYPE 2 DIABETES MELLITUS WITH HYPERGLYCEMIA, WITHOUT LONG-TERM CURRENT USE OF INSULIN (HCC): Primary | ICD-10-CM

## 2022-06-21 DIAGNOSIS — E78.2 MIXED HYPERLIPIDEMIA: ICD-10-CM

## 2022-06-21 PROCEDURE — 95251 CONT GLUC MNTR ANALYSIS I&R: CPT | Performed by: INTERNAL MEDICINE

## 2022-06-21 PROCEDURE — 99214 OFFICE O/P EST MOD 30 MIN: CPT | Performed by: INTERNAL MEDICINE

## 2022-06-21 PROCEDURE — 3052F HG A1C>EQUAL 8.0%<EQUAL 9.0%: CPT | Performed by: INTERNAL MEDICINE

## 2022-06-21 RX ORDER — INSULIN ASPART 100 [IU]/ML
INJECTION, SOLUTION INTRAVENOUS; SUBCUTANEOUS
Qty: 15 ML | Refills: 11 | Status: SHIPPED | OUTPATIENT
Start: 2022-06-21 | End: 2022-11-03 | Stop reason: SDUPTHER

## 2022-06-21 RX ORDER — INSULIN DEGLUDEC INJECTION 100 U/ML
15 INJECTION, SOLUTION SUBCUTANEOUS NIGHTLY
Qty: 15 ML | Refills: 11 | Status: SHIPPED | OUTPATIENT
Start: 2022-06-21 | End: 2022-11-03 | Stop reason: SDUPTHER

## 2022-06-21 RX ORDER — BLOOD SUGAR DIAGNOSTIC
STRIP MISCELLANEOUS
Qty: 100 EACH | Refills: 11 | Status: SHIPPED | OUTPATIENT
Start: 2022-06-21 | End: 2022-11-03

## 2022-06-21 RX ORDER — ATORVASTATIN CALCIUM 20 MG/1
20 TABLET, FILM COATED ORAL NIGHTLY
Qty: 30 TABLET | Refills: 11 | Status: SHIPPED | OUTPATIENT
Start: 2022-06-21 | End: 2022-11-03 | Stop reason: SDUPTHER

## 2022-06-21 RX ORDER — LANCETS 30 GAUGE
EACH MISCELLANEOUS
Qty: 100 EACH | Refills: 11 | Status: SHIPPED | OUTPATIENT
Start: 2022-06-21 | End: 2022-11-03

## 2022-06-21 RX ORDER — SEMAGLUTIDE 1.34 MG/ML
0.5 INJECTION, SOLUTION SUBCUTANEOUS
Qty: 1.5 ML | Refills: 11 | Status: SHIPPED | OUTPATIENT
Start: 2022-06-21 | End: 2022-11-03 | Stop reason: SINTOL

## 2022-06-21 ASSESSMENT — ENCOUNTER SYMPTOMS
CONSTIPATION: 0
DIARRHEA: 1

## 2022-06-21 NOTE — PROGRESS NOTES
Bj Almanza MD, [facility]  [address], [address] [facility], [address] [place], [place] [address]  Phone [phone]  Facsimile [phone]          Macy Mcburney is a 37 y.o. female seen 6/21/2022 for follow-up of type 2 diabetes mellitus        ASSESSMENT AND PLAN:    1. Uncontrolled type 2 diabetes mellitus with hyperglycemia, without long-term current use of insulin (HCC)  Her glycemic control is suboptimal and her hemoglobin A1c is not at goal less than 7. She is having fasting hyperglycemia and I will start her on Tresiba as below. I instructed her to increase her Tresiba by 1 unit every 5 nights until her fasting blood glucoses are consistently between . Unfortunately her Freestyle Keshawn Bolus does not appear to be accurate and she will resume checking fingerstick blood glucoses. Eye exam negative 5/2021 and she has appointment scheduled 8/2022. Urine microalbumin positive 9/2021. I will repeat a urine microalbumin in the future prior to committing her to an ACE inhibitor or ARB. Update labs prior to next visit. - TRESIBA FLEXTOUCH 100 UNIT/ML SOPN; Inject 15 Units into the skin at bedtime  Dispense: 15 mL; Refill: 11  - OZEMPIC, 0.25 OR 0.5 MG/DOSE, 2 MG/1.5ML SOPN; Inject 0.5 mg into the skin every 7 days  Dispense: 1.5 mL; Refill: 11  - metFORMIN (GLUCOPHAGE) 1000 MG tablet; Take 1 tablet by mouth 2 times daily (with meals)  Dispense: 60 tablet; Refill: 11  - NOVOLOG FLEXPEN 100 UNIT/ML injection pen; Correction scale 1/50>150 (up to 20 units daily)  Dispense: 15 mL; Refill: 11  - ONETOUCH ULTRA strip; 3 times a day  Dispense: 100 each; Refill: 11  - OneTouch Delica Lancets 95A MISC; 3 times a day  Dispense: 100 each; Refill: 11    2. Mixed hyperlipidemia  LDL not at goal and I will increase her atorvastatin below. - atorvastatin (LIPITOR) 20 MG tablet; Take 1 tablet by mouth at bedtime  Dispense: 30 tablet;  Refill: 11                Procedures:     Interpretation of 72 hour glucose monitor: At least 72 hours of data were reviewed. The patient utilizes a [facility] continuous glucose monitoring system. The average glucose during the reviewed timeframe was 164 with a glucose variability of 60.5% (time in range  37%, hyperglycemia  40%, hypoglycemia 23%). She is having fasting hyperglycemia. There are no patterns to correlate with the increased percentage of hypoglycemia. Follow-up and Dispositions    · Return in about 4 months (around 10/21/2022). HISTORY OF PRESENT ILLNESS:    DIABETES MELLITUS     Diagnosis: Type 2 diabetes mellitus diagnosed in 2005.     Diet: No sweet tea, juice or regular soft drinks.  She avoids sweets for the most part.     Exercise: No regular exercise.      Monitoring:  One Touch Ultra 2 and Freestyle Gerald 2.     Blood Glucose: By review of Gerald download: Average 164 (time in range 37%, hyperglycemia 40%).    Hypoglycemia: She does not always have hypoglycemic awareness. [place] is low 23% per Kendall Costello but she states that she is not low that often.     Hemoglobin A1c:  11/20/2018: 9.3.  2/10/2020: 15.3.  6/9/2020: 6.5.  9/10/2020: 5.7.  9/7/2021: 6.6.  1/4/2022: 7.4.  6/16/2022: 8.3.     Microalbumin/Nephropathy:  3/9/2020: Creatinine 0.75 (GFR >60).    6/9/2020: Creatinine 0.78 (GFR 97), urine microalbumin/creatinine 4.  9/10/2020: Creatinine 0.71 (). 11/9/2020: Creatinine 0.68 ().  9/7/2021: Creatinine 0.79 (GFR 95), urine microalbumin/creatinine 36.  6/16/2022: Creatinine 0.90 (GFR >60).    Neuropathy: Denies burning, numbness, tingling of feet.     Retinopathy: Eye exam 5/2021 - no retinopathy. She has an appointment scheduled in 8/2022.     Lipids:  2/10/2020: Total cholesterol 171, triglycerides 121, HDL 42, , VLDL 24.  6/9/2020: Total cholesterol 165, triglycerides 56, HDL 71, LDL 83, VLDL 11.  9/7/2021:  Total cholesterol 213, triglycerides 182, HDL 47, , VLDL 32.  6/16/2022: Total cholesterol 182, triglycerides 115, HDL 46, , VLDL 23.     TSH:  6/9/2020: TSH 3.750.  6/17/2020: TSH 3.860.  9/7/2021: TSH 3.240.  12/13/2021: TSH 3.110.     Pregnancy: No future pregnancy plans. Depo-Provera every 3 months.     Prior Treatment: She previously took Victoza but it was stopped due to pregnancy. She was switched to Ozempic.         Review of Systems   Constitutional:        Weight decreased 12 pounds since last visit. Gastrointestinal: Positive for diarrhea (for 3 days after taking Ozempic). Negative for constipation. Vital Signs:  /70   Pulse 87   Ht 5' 10\" (1.778 m)   Wt 264 lb (119.7 kg)   SpO2 98%   BMI 37.88 kg/m²     Wt Readings from Last 3 Encounters:   06/21/22 264 lb (119.7 kg)   05/25/22 265 lb (120.2 kg)   05/24/22 266 lb (120.7 kg)       Physical Exam  Constitutional:       General: She is not in acute distress. Neck:      Thyroid: No thyroid mass or thyromegaly. Cardiovascular:      Rate and Rhythm: Normal rate and regular rhythm. Comments: DP pulses 2+ bilaterally. No edema. Lymphadenopathy:      Cervical: No cervical adenopathy. Skin:     Comments: No lesions on feet. Neurological:      Motor: No tremor. Comments: Monofilament intact. Ankle reflexes normal bilaterally. Orders Placed This Encounter   Procedures    Hemoglobin A1C     Standing Status:   Future     Standing Expiration Date:   6/21/2023    Comprehensive Metabolic Panel     Standing Status:   Future     Standing Expiration Date:   6/21/2023    Lipid Panel     Standing Status:   Future     Standing Expiration Date:   6/21/2023     Order Specific Question:   Is Patient Fasting?/# of Hours     Answer:    Yes    Microalbumin / Creatinine Urine Ratio     Standing Status:   Future     Standing Expiration Date:   6/21/2023    TSH with Reflex     Standing Status:   Future     Standing Expiration Date:   6/21/2023    MD CONTINUOUS GLUCOSE MONITORING ANALYSIS I&R       Current Outpatient Medications   Medication Sig Dispense Refill    TRESIBA FLEXTOUCH 100 UNIT/ML SOPN Inject 15 Units into the skin at bedtime 15 mL 11    OZEMPIC, 0.25 OR 0.5 MG/DOSE, 2 MG/1.5ML SOPN Inject 0.5 mg into the skin every 7 days 1.5 mL 11    metFORMIN (GLUCOPHAGE) 1000 MG tablet Take 1 tablet by mouth 2 times daily (with meals) 60 tablet 11    NOVOLOG FLEXPEN 100 UNIT/ML injection pen Correction scale 1/50>150 (up to 20 units daily) 15 mL 11    ONETOUCH ULTRA strip 3 times a day 100 each 11    OneTouch Delica Lancets 78C MISC 3 times a day 100 each 11    atorvastatin (LIPITOR) 20 MG tablet Take 1 tablet by mouth at bedtime 30 tablet 11    ibuprofen (ADVIL;MOTRIN) 800 MG tablet Take 800 mg by mouth every 6 hours as needed      medroxyPROGESTERone (DEPO-PROVERA) 150 MG/ML injection        No current facility-administered medications for this visit.

## 2022-08-25 DIAGNOSIS — D50.9 IRON DEFICIENCY ANEMIA, UNSPECIFIED IRON DEFICIENCY ANEMIA TYPE: Primary | ICD-10-CM

## 2022-08-26 ENCOUNTER — TELEPHONE (OUTPATIENT)
Dept: ONCOLOGY | Age: 42
End: 2022-08-26

## 2022-09-01 ENCOUNTER — HOSPITAL ENCOUNTER (OUTPATIENT)
Dept: LAB | Age: 42
Discharge: HOME | End: 2022-09-04
Payer: COMMERCIAL

## 2022-09-01 ENCOUNTER — OFFICE VISIT (OUTPATIENT)
Dept: ONCOLOGY | Age: 42
End: 2022-09-01
Payer: COMMERCIAL

## 2022-09-01 VITALS
BODY MASS INDEX: 37.22 KG/M2 | WEIGHT: 260 LBS | DIASTOLIC BLOOD PRESSURE: 80 MMHG | SYSTOLIC BLOOD PRESSURE: 117 MMHG | RESPIRATION RATE: 14 BRPM | HEIGHT: 70 IN | OXYGEN SATURATION: 100 % | HEART RATE: 93 BPM | TEMPERATURE: 98.3 F

## 2022-09-01 DIAGNOSIS — D50.9 IRON DEFICIENCY ANEMIA, UNSPECIFIED IRON DEFICIENCY ANEMIA TYPE: ICD-10-CM

## 2022-09-01 DIAGNOSIS — D50.9 IRON DEFICIENCY ANEMIA, UNSPECIFIED IRON DEFICIENCY ANEMIA TYPE: Primary | ICD-10-CM

## 2022-09-01 LAB
ALBUMIN SERPL-MCNC: 3.7 G/DL (ref 3.5–5)
ALBUMIN/GLOB SERPL: 1 {RATIO} (ref 1.2–3.5)
ALP SERPL-CCNC: 96 U/L (ref 50–136)
ALT SERPL-CCNC: 25 U/L (ref 12–65)
ANION GAP SERPL CALC-SCNC: 6 MMOL/L (ref 4–13)
AST SERPL-CCNC: 17 U/L (ref 15–37)
BASOPHILS # BLD: 0 K/UL (ref 0–0.2)
BASOPHILS NFR BLD: 0 % (ref 0–2)
BILIRUB SERPL-MCNC: 0.2 MG/DL (ref 0.2–1.1)
BUN SERPL-MCNC: 19 MG/DL (ref 6–23)
CALCIUM SERPL-MCNC: 9.5 MG/DL (ref 8.3–10.4)
CHLORIDE SERPL-SCNC: 109 MMOL/L (ref 101–110)
CO2 SERPL-SCNC: 27 MMOL/L (ref 21–32)
CREAT SERPL-MCNC: 0.9 MG/DL (ref 0.6–1)
DIFFERENTIAL METHOD BLD: NORMAL
EOSINOPHIL # BLD: 0.2 K/UL (ref 0–0.8)
EOSINOPHIL NFR BLD: 2 % (ref 0.5–7.8)
ERYTHROCYTE [DISTWIDTH] IN BLOOD BY AUTOMATED COUNT: 14 % (ref 11.9–14.6)
FERRITIN SERPL-MCNC: 50 NG/ML (ref 8–388)
GLOBULIN SER CALC-MCNC: 3.7 G/DL (ref 2.3–3.5)
GLUCOSE SERPL-MCNC: 41 MG/DL (ref 65–100)
HCT VFR BLD AUTO: 43 % (ref 35.8–46.3)
HGB BLD-MCNC: 13.6 G/DL (ref 11.7–15.4)
IMM GRANULOCYTES # BLD AUTO: 0 K/UL (ref 0–0.5)
IMM GRANULOCYTES NFR BLD AUTO: 0 % (ref 0–5)
IRON SATN MFR SERPL: 11 %
IRON SERPL-MCNC: 42 UG/DL (ref 35–150)
LYMPHOCYTES # BLD: 3.5 K/UL (ref 0.5–4.6)
LYMPHOCYTES NFR BLD: 34 % (ref 13–44)
MCH RBC QN AUTO: 28.8 PG (ref 26.1–32.9)
MCHC RBC AUTO-ENTMCNC: 31.6 G/DL (ref 31.4–35)
MCV RBC AUTO: 90.9 FL (ref 79.6–97.8)
MONOCYTES # BLD: 0.8 K/UL (ref 0.1–1.3)
MONOCYTES NFR BLD: 8 % (ref 4–12)
NEUTS SEG # BLD: 5.6 K/UL (ref 1.7–8.2)
NEUTS SEG NFR BLD: 56 % (ref 43–78)
NRBC # BLD: 0 K/UL (ref 0–0.2)
PLATELET # BLD AUTO: 320 K/UL (ref 150–450)
PMV BLD AUTO: 10 FL (ref 9.4–12.3)
POTASSIUM SERPL-SCNC: 4.1 MMOL/L (ref 3.5–5.1)
PROT SERPL-MCNC: 7.4 G/DL (ref 6.3–8.2)
RBC # BLD AUTO: 4.73 M/UL (ref 4.05–5.2)
SODIUM SERPL-SCNC: 142 MMOL/L (ref 136–145)
TIBC SERPL-MCNC: 376 UG/DL (ref 250–450)
WBC # BLD AUTO: 10.1 K/UL (ref 4.3–11.1)

## 2022-09-01 PROCEDURE — 99213 OFFICE O/P EST LOW 20 MIN: CPT | Performed by: INTERNAL MEDICINE

## 2022-09-01 PROCEDURE — 85025 COMPLETE CBC W/AUTO DIFF WBC: CPT

## 2022-09-01 PROCEDURE — 80053 COMPREHEN METABOLIC PANEL: CPT

## 2022-09-01 PROCEDURE — 82728 ASSAY OF FERRITIN: CPT

## 2022-09-01 PROCEDURE — 36415 COLL VENOUS BLD VENIPUNCTURE: CPT

## 2022-09-01 PROCEDURE — 83540 ASSAY OF IRON: CPT

## 2022-09-01 ASSESSMENT — PATIENT HEALTH QUESTIONNAIRE - PHQ9
2. FEELING DOWN, DEPRESSED OR HOPELESS: 0
SUM OF ALL RESPONSES TO PHQ QUESTIONS 1-9: 0

## 2022-09-01 NOTE — PROGRESS NOTES
HEMATOLOGY/ONCOLOGY FOLLOWUP  VISIT      Patient Name: Arik Cote             Date of Visit: 2022  : 1980  Age:41 y.o. Presenting Complaint:  Arik Cote  is seen in follow-up for iron deficiency anemia    History of Present Illness:   Ms. Enrestine Jett was seen for the first time in our office in 2022. She was referred by Dr. Emilia Zavala for evaluation of iron deficiency. She was an otherwise healthy woman,  4 para 3 abortus  1 who had no significant prior medical history. Beginning in approximately 2021 she developed rather significant difficulties with menorrhagia. In fact, she indicated that she had nearly continuous menstrual flow from October through early December. At the time of her initial visit here, she was undergoing evaluation and work-up by Dr. Emilia Zavala for these difficulties. In the process of her evaluation, she underwent routine laboratory studies on . At that time, her hemoglobin was 11.4 hematocrit  37.0, and platelet count 682,954 with an MCV of 83.9. Her white count and differential were normal.  She also underwent iron studies notable for an iron of 32, transferrin of 361 and a saturation of 9% with a ferritin of 14. The patient indicates that  she is mildly fatigued. She has had no pica. She has breast-fed her children and wean her youngest child in 2021. She has had no difficulty with bleeding during other procedures or with loss of deciduous teeth. She returns today for follow-up. She has been managed with oral iron taking 2 tablets of ferrous sulfate on a daily basis. In the interim, she has also been placed on Depo-Provera which has resulted in a significant improvement in her uterine bleeding to virtually nil. With this combination of interventions, her hemoglobin has risen and has remained stable. Her ferritin level today is 50.     Medications:   Current Outpatient Medications   Medication Sig Dispense Refill    TRESIBA FLEXTOUCH 100 UNIT/ML SOPN Inject 15 Units into the skin at bedtime 15 mL 11    OZEMPIC, 0.25 OR 0.5 MG/DOSE, 2 MG/1.5ML SOPN Inject 0.5 mg into the skin every 7 days 1.5 mL 11    metFORMIN (GLUCOPHAGE) 1000 MG tablet Take 1 tablet by mouth 2 times daily (with meals) 60 tablet 11    NOVOLOG FLEXPEN 100 UNIT/ML injection pen Correction scale 1/50>150 (up to 20 units daily) 15 mL 11    ONETOUCH ULTRA strip 3 times a day 100 each 11    OneTouch Delica Lancets 68D MISC 3 times a day 100 each 11    atorvastatin (LIPITOR) 20 MG tablet Take 1 tablet by mouth at bedtime 30 tablet 11    ibuprofen (ADVIL;MOTRIN) 800 MG tablet Take 800 mg by mouth every 6 hours as needed      medroxyPROGESTERone (DEPO-PROVERA) 150 MG/ML injection        No current facility-administered medications for this visit. Allergies: Allergies   Allergen Reactions    Shellfish-Derived Products Anaphylaxis       Review of Systems: The Review of Systems is documented in full in the internal medical record. All systems are negative other than for those noted above. Past Medical History:  Documented in electronic medical record    Past Surgical History:  Documented in electronic medical record    Social History:  Documented in electronic medical record    Family History:  Documented in electronic medical record      Physical Examination:  General Appearance: Healthy appearing patient in no acute distress. Vital signs: /80 (Site: Left Upper Arm, Position: Standing)   Pulse 93   Temp 98.3 °F (36.8 °C)   Resp 14   Ht 5' 10\" (1.778 m)   Wt 260 lb (117.9 kg)   LMP 08/01/2022 Comment: Still having Menstrual  SpO2 100%   BMI 37.31 kg/m²     Performance status: ECOG Level: 0  Distress  Screening Score: PHQ-9 Total Score: 0 (9/1/2022  3:26 PM)    Pain score: Pain Score:   0 - No pain (Fatigue-0)  HEENT: No oral exam performed. Neck: Supple. There is no thyromegaly. Lymph nodes:  There is no cervical, supraclavicular, axillary or inguinal adenopathy. Breasts: Not examined  Lungs: The lungs are clear to auscultation and percussion. There is no egophony. There is no chest wall tenderness and no  use of accessory respiratory musculature. Heart: There is no jugular venous distention. The rate is normal and rhythm regular. The S1 and S2 are normal and there are no murmurs or rubs. Abdomen: Soft, non-tender, bowel sounds present and normal, no appreciated hepatosplenomegaly. No palpable masses. Skin: No rash, petechiae or ecchymoses. No evidence of malignancy. Extremities: No cyanosis, clubbing or edema. Labs/Imaging:  Lab Results   Component Value Date/Time    WBC 10.1 09/01/2022 03:14 PM    HGB 13.6 09/01/2022 03:14 PM    HCT 43.0 09/01/2022 03:14 PM     09/01/2022 03:14 PM    MCV 90.9 09/01/2022 03:14 PM       Lab Results   Component Value Date/Time     09/01/2022 03:14 PM    K 4.1 09/01/2022 03:14 PM     09/01/2022 03:14 PM    CO2 27 09/01/2022 03:14 PM    BUN 19 09/01/2022 03:14 PM    GFRAA >60 09/01/2022 03:14 PM    GLOB 3.7 09/01/2022 03:14 PM    ALT 25 09/01/2022 03:14 PM       Above results reviewed with patient. ASSESSMENT:  Ms. Kelsi Chow has a history of iron deficiency anemia secondary to menorrhagia. The latter has been managed with the introduction of Depo-Provera. The iron deficiency itself has been managed with oral supplementation. With that, she now has a healthy hemoglobin and reasonable iron stores. PLAN:  At this point, I have suggested she could stop the iron in the absence of any bleeding. I anticipate she will maintain her hemoglobin provided her bleeding remains under control. Should she have problems again with blood loss and resultant iron deficiency, we would be happy to see her again. Otherwise however there is no planned follow-up here.         RESUSCITATION DIRECTIVES/HOSPICE CARE;  Full Support           Renny Pickens MD [facility]    Oncology and Hematology   [facility]  [address] [address]  P [phone]  F [phone]  [email or web address]

## 2022-09-01 NOTE — PATIENT INSTRUCTIONS
Patient Instructions from Today's Visit    Reason for Visit:  Follow up-COREY    Diagnosis Information:  [email or web address] [email or web address]-us/[email or web address]-answers-patient-education-materials/mjxg-[email or web address]-[email or web address]-sheets      Plan: Your numbers look good today. Your iron levels have continued to go. If you aren't bleeding anymore we do think you can stop taking the oral iron. Your primary care doctor continue to monitor your blood counts. We are here if you ever need us in the future.   Follow Up:  As needed    Recent Lab Results:  Hospital Outpatient Visit on 09/01/2022   Component Date Value Ref Range Status    WBC 09/01/2022 10.1  4.3 - 11.1 K/uL Final    RBC 09/01/2022 4.73  4.05 - 5.2 M/uL Final    Hemoglobin 09/01/2022 13.6  11.7 - 15.4 g/dL Final    Hematocrit 09/01/2022 43.0  35.8 - 46.3 % Final    MCV 09/01/2022 90.9  79.6 - 97.8 FL Final    MCH 09/01/2022 28.8  26.1 - 32.9 PG Final    MCHC 09/01/2022 31.6  31.4 - 35.0 g/dL Final    RDW 09/01/2022 14.0  11.9 - 14.6 % Final    Platelets 84/64/2944 320  150 - 450 K/uL Final    MPV 09/01/2022 10.0  9.4 - 12.3 FL Final    nRBC 09/01/2022 0.00  0.0 - 0.2 K/uL Final    **Note: Absolute NRBC parameter is now reported with Hemogram**    Seg Neutrophils 09/01/2022 56  43 - 78 % Final    Lymphocytes 09/01/2022 34  13 - 44 % Final    Monocytes 09/01/2022 8  4.0 - 12.0 % Final    Eosinophils % 09/01/2022 2  0.5 - 7.8 % Final    Basophils 09/01/2022 0  0.0 - 2.0 % Final    Immature Granulocytes 09/01/2022 0  0.0 - 5.0 % Final    Segs Absolute 09/01/2022 5.6  1.7 - 8.2 K/UL Final    Absolute Lymph # 09/01/2022 3.5  0.5 - 4.6 K/UL Final    Absolute Mono # 09/01/2022 0.8  0.1 - 1.3 K/UL Final    Absolute Eos # 09/01/2022 0.2  0.0 - 0.8 K/UL Final    Basophils Absolute 09/01/2022 0.0  0.0 - 0.2 K/UL Final    Absolute Immature Granulocyte 09/01/2022 0.0  0.0 - 0.5 K/UL Final    Differential Type 09/01/2022 AUTOMATED    Final    Sodium 09/01/2022 142  136 - 145 mmol/L Final    Potassium 09/01/2022 4.1  3.5 - 5.1 mmol/L Final    Chloride 09/01/2022 109  101 - 110 mmol/L Final    CO2 09/01/2022 27  21 - 32 mmol/L Final    Anion Gap 09/01/2022 6  4 - 13 mmol/L Final    Glucose 09/01/2022 41 (A) 65 - 100 mg/dL Final    BUN 09/01/2022 19  6 - 23 MG/DL Final    Creatinine 09/01/2022 0.90  0.6 - 1.0 MG/DL Final    GFR  09/01/2022 >60  >60 ml/min/1.73m2 Final    GFR Non- 09/01/2022 >60  >60 ml/min/1.73m2 Final    Comment:      Estimated GFR is calculated using the Modification of Diet in Renal Disease (MDRD) Study equation, reported for both  Americans (GFRAA) and non- Americans (GFRNA), and normalized to 1.73m2 body surface area. The physician must decide which value applies to the patient. The MDRD study equation should only be used in individuals age 25 or older. It has not been validated for the following: pregnant women, patients with serious comorbid conditions,or on certain medications, or persons with extremes of body size, muscle mass, or nutritional status. Calcium 09/01/2022 9.5  8.3 - 10.4 MG/DL Final    Total Bilirubin 09/01/2022 0.2  0.2 - 1.1 MG/DL Final    ALT 09/01/2022 25  12 - 65 U/L Final    AST 09/01/2022 17  15 - 37 U/L Final    Alk Phosphatase 09/01/2022 96  50 - 136 U/L Final    Total Protein 09/01/2022 7.4  6.3 - 8.2 g/dL Final    Albumin 09/01/2022 3.7  3.5 - 5.0 g/dL Final    Globulin 09/01/2022 3.7 (A) 2.3 - 3.5 g/dL Final    Albumin/Globulin Ratio 09/01/2022 1.0 (A) 1.2 - 3.5   Final    Ferritin 09/01/2022 50  8 - 388 NG/ML Final    Iron 09/01/2022 42  35 - 150 ug/dL Final    Comment: Known Interfering Substances section:  \"Iron values may be falsely elevated in  serum samples from patients with  anticoagulants (e.g., hemodialysis patients). \"  Limitations of Procedure section:  \"Turbidity resulting from precipitation of  fibrinogen in the serum of patients treated  with anticoagulants (e.g. hemodialysis  patients) may cause spuriously elevated  iron results. \"      TIBC 09/01/2022 376  250 - 450 ug/dL Final    TRANSFERRIN SATURATION 09/01/2022 11 (A) >20 % Final         Treatment Summary has been discussed and given to patient:         -------------------------------------------------------------------------------------------------------------------  Please call our office at [phone] if you have any  of the following symptoms:   Fever of 100.5 or greater  Chills  Shortness of breath  Swelling or pain in one leg    After office hours an answering service is available and will contact a provider for emergencies or if you are experiencing any of the above symptoms. Patient does express an interest in My Chart. My Chart log in information explained on the after visit summary printout at the [facility] desk.     Gigi Diehl MA

## 2022-10-28 DIAGNOSIS — E78.2 MIXED HYPERLIPIDEMIA: ICD-10-CM

## 2022-10-28 DIAGNOSIS — E11.65 UNCONTROLLED TYPE 2 DIABETES MELLITUS WITH HYPERGLYCEMIA, WITHOUT LONG-TERM CURRENT USE OF INSULIN (HCC): ICD-10-CM

## 2022-10-28 LAB
ALBUMIN SERPL-MCNC: 3.7 G/DL (ref 3.5–5)
ALBUMIN/GLOB SERPL: 1.3 {RATIO} (ref 0.4–1.6)
ALP SERPL-CCNC: 122 U/L (ref 50–136)
ALT SERPL-CCNC: 51 U/L (ref 12–65)
ANION GAP SERPL CALC-SCNC: 5 MMOL/L (ref 2–11)
AST SERPL-CCNC: 17 U/L (ref 15–37)
BILIRUB SERPL-MCNC: 0.3 MG/DL (ref 0.2–1.1)
BUN SERPL-MCNC: 18 MG/DL (ref 6–23)
CALCIUM SERPL-MCNC: 9.5 MG/DL (ref 8.3–10.4)
CHLORIDE SERPL-SCNC: 110 MMOL/L (ref 101–110)
CHOLEST SERPL-MCNC: 142 MG/DL
CO2 SERPL-SCNC: 26 MMOL/L (ref 21–32)
CREAT SERPL-MCNC: 0.8 MG/DL (ref 0.6–1)
CREAT UR-MCNC: 96 MG/DL
EST. AVERAGE GLUCOSE BLD GHB EST-MCNC: 180 MG/DL
GLOBULIN SER CALC-MCNC: 2.9 G/DL (ref 2.8–4.5)
GLUCOSE SERPL-MCNC: 73 MG/DL (ref 65–100)
HBA1C MFR BLD: 7.9 % (ref 4.8–5.6)
HDLC SERPL-MCNC: 45 MG/DL (ref 40–60)
HDLC SERPL: 3.2 {RATIO}
LDLC SERPL CALC-MCNC: 66.6 MG/DL
MICROALBUMIN UR-MCNC: 0.64 MG/DL
MICROALBUMIN/CREAT UR-RTO: 7 MG/G
POTASSIUM SERPL-SCNC: 4.4 MMOL/L (ref 3.5–5.1)
PROT SERPL-MCNC: 6.6 G/DL (ref 6.3–8.2)
SODIUM SERPL-SCNC: 141 MMOL/L (ref 133–143)
TRIGL SERPL-MCNC: 152 MG/DL (ref 35–150)
TSH W FREE THYROID IF ABNORMAL: 2.5 UIU/ML (ref 0.36–3.74)
VLDLC SERPL CALC-MCNC: 30.4 MG/DL (ref 6–23)

## 2022-11-03 ENCOUNTER — OFFICE VISIT (OUTPATIENT)
Dept: ENDOCRINOLOGY | Age: 42
End: 2022-11-03
Payer: COMMERCIAL

## 2022-11-03 VITALS
OXYGEN SATURATION: 98 % | HEIGHT: 70 IN | SYSTOLIC BLOOD PRESSURE: 126 MMHG | DIASTOLIC BLOOD PRESSURE: 74 MMHG | HEART RATE: 83 BPM | WEIGHT: 260 LBS | BODY MASS INDEX: 37.22 KG/M2

## 2022-11-03 DIAGNOSIS — E11.65 UNCONTROLLED TYPE 2 DIABETES MELLITUS WITH HYPERGLYCEMIA, WITHOUT LONG-TERM CURRENT USE OF INSULIN (HCC): Primary | ICD-10-CM

## 2022-11-03 DIAGNOSIS — E78.2 MIXED HYPERLIPIDEMIA: ICD-10-CM

## 2022-11-03 PROCEDURE — 99214 OFFICE O/P EST MOD 30 MIN: CPT | Performed by: INTERNAL MEDICINE

## 2022-11-03 PROCEDURE — 3051F HG A1C>EQUAL 7.0%<8.0%: CPT | Performed by: INTERNAL MEDICINE

## 2022-11-03 RX ORDER — GLIMEPIRIDE 1 MG/1
1 TABLET ORAL
Qty: 30 TABLET | Refills: 11 | Status: SHIPPED | OUTPATIENT
Start: 2022-11-03

## 2022-11-03 RX ORDER — INSULIN DEGLUDEC INJECTION 100 U/ML
25 INJECTION, SOLUTION SUBCUTANEOUS NIGHTLY
Qty: 15 ML | Refills: 11 | Status: SHIPPED | OUTPATIENT
Start: 2022-11-03

## 2022-11-03 RX ORDER — INSULIN ASPART 100 [IU]/ML
INJECTION, SOLUTION INTRAVENOUS; SUBCUTANEOUS
Qty: 15 ML | Refills: 11 | Status: SHIPPED | OUTPATIENT
Start: 2022-11-03

## 2022-11-03 RX ORDER — LANCETS 30 GAUGE
EACH MISCELLANEOUS
Qty: 100 EACH | Refills: 11 | Status: SHIPPED | OUTPATIENT
Start: 2022-11-03

## 2022-11-03 RX ORDER — EMPAGLIFLOZIN 10 MG/1
10 TABLET, FILM COATED ORAL DAILY
Qty: 30 TABLET | Refills: 11 | Status: SHIPPED | OUTPATIENT
Start: 2022-11-03

## 2022-11-03 RX ORDER — BLOOD SUGAR DIAGNOSTIC
STRIP MISCELLANEOUS
Qty: 100 EACH | Refills: 11 | Status: SHIPPED | OUTPATIENT
Start: 2022-11-03

## 2022-11-03 RX ORDER — ATORVASTATIN CALCIUM 20 MG/1
20 TABLET, FILM COATED ORAL NIGHTLY
Qty: 30 TABLET | Refills: 11 | Status: SHIPPED | OUTPATIENT
Start: 2022-11-03

## 2022-11-03 ASSESSMENT — ENCOUNTER SYMPTOMS
DIARRHEA: 1
CONSTIPATION: 0

## 2022-11-03 NOTE — PROGRESS NOTES
Bj Hebert MD, [facility] Endocrinology and Thyroid Nodule Clinic  [address], [address] [facility], [address] [place] [address]  Phone [phone]          Shelby Washington is a 40 y.o. female seen 11/3/2022 for follow-up of type 2 diabetes mellitus        ASSESSMENT AND PLAN:    1. Uncontrolled type 2 diabetes mellitus with hyperglycemia, without long-term current use of insulin (HCC)  Her glycemic control is suboptimal and her hemoglobin A1c is not at goal less than 7. She is not tolerating Ozempic and I will have her stop it. I will increase her Jurline Listen and add Jardiance and glimepiride as below. Eye exam negative 8/2022. Urine microalbumin negative 10/2022.    - TRESIBA FLEXTOUCH 100 UNIT/ML SOPN; Inject 25 Units into the skin at bedtime  Dispense: 15 mL; Refill: 11  - NOVOLOG FLEXPEN 100 UNIT/ML injection pen; Correction scale 1/50>150 (up to 20 units daily)  Dispense: 15 mL; Refill: 11  - glimepiride (AMARYL) 1 MG tablet; Take 1 tablet by mouth every morning (before breakfast)  Dispense: 30 tablet; Refill: 11  - JARDIANCE 10 MG tablet; Take 1 tablet by mouth daily  Dispense: 30 tablet; Refill: 11  - metFORMIN (GLUCOPHAGE) 1000 MG tablet; Take 1 tablet by mouth 2 times daily (with meals)  Dispense: 60 tablet; Refill: 11  - ONETOUCH VERIO strip; 2 times a day  Dispense: 100 each; Refill: 11  - Lancets ([address]) MISC; 2 times a day  Dispense: 100 each; Refill: 11    2. Mixed hyperlipidemia  LDL at goal 10/2022.    - atorvastatin (LIPITOR) 20 MG tablet; Take 1 tablet by mouth at bedtime  Dispense: 30 tablet; Refill: 11      Follow-up and Dispositions    Return in about 4 months (around 3/3/2023). HISTORY OF PRESENT ILLNESS:    DIABETES MELLITUS     Diagnosis: Type 2 diabetes mellitus diagnosed in 2005. Diet: No sweet tea, juice or regular soft drinks. She avoids sweets for the most part. Exercise: No regular exercise. Monitoring:   One Touch Ultra 2 - not working for past 3 weeks. Blood Glucose: By recall: Fasting 130s to 180s, bedtime 190s to 300. Hypoglycemia: She does not always have hypoglycemic awareness. No recent lows. Hemoglobin A1c:  11/20/2018: 9.3.  2/10/2020: 15.3.  6/9/2020: 6.5.  9/10/2020: 5.7.  9/7/2021: 6.6.  1/4/2022: 7.4.  6/16/2022: 8.3.  10/28/2022: 7.9. Microalbumin/Nephropathy:  3/9/2020: Creatinine 0.75 (GFR >60).    6/9/2020: Creatinine 0.78 (GFR 97), urine microalbumin/creatinine 4.  9/10/2020: Creatinine 0.71 (). 11/9/2020: Creatinine 0.68 ().  9/7/2021: Creatinine 0.79 (GFR 95), urine microalbumin/creatinine 36.  6/16/2022: Creatinine 0.90 (GFR >60). 10/28/2022: Creatinine 0.80 (GFR >60), urine microalbumin/creatinine 7. Neuropathy: Denies burning, numbness, tingling of feet. Retinopathy: Eye exam 8/2022 - no retinopathy. Lipids:  2/10/2020: Total cholesterol 171, triglycerides 121, HDL 42, , VLDL 24.  6/9/2020: Total cholesterol 165, triglycerides 56, HDL 71, LDL 83, VLDL 11.  9/7/2021: Total cholesterol 213, triglycerides 182, HDL 47, , VLDL 32.  6/16/2022: Total cholesterol 182, triglycerides 115, HDL 46, , VLDL 23.  10/28/2022: Total cholesterol 142, triglycerides 152, HDL 45, LDL 66.6, VLDL 30.4. TSH:  6/9/2020: TSH 3.750.  6/17/2020: TSH 3.860.  9/7/2021: TSH 3.240.  12/13/2021: TSH 3.110.  10/28/2022: TSH 2.50. Pregnancy: No future pregnancy plans. Depo-Provera every 3 months. Prior Treatment: She previously took Victoza but it was stopped due to pregnancy. She was switched to Ozempic. She states that Ozempic causes diarrhea, heartburn and abdominal pain. Review of Systems   Constitutional:  Negative for unexpected weight change. Gastrointestinal:  Positive for diarrhea. Negative for constipation.      Vital Signs:  /74   Pulse 83   Ht 5' 10\" (1.778 m)   Wt 260 lb (117.9 kg)   SpO2 98%   BMI 37.31 kg/m² Wt Readings from Last 3 Encounters:   11/03/22 260 lb (117.9 kg)   09/01/22 260 lb (117.9 kg)   06/21/22 264 lb (119.7 kg)       Physical Exam  Constitutional:       General: She is not in acute distress. Neck:      Thyroid: No thyroid mass or thyromegaly. Cardiovascular:      Rate and Rhythm: Normal rate and regular rhythm. Comments: DP pulses 2+ bilaterally. No edema. Lymphadenopathy:      Cervical: No cervical adenopathy. Skin:     Comments: No lesions on feet. Neurological:      Motor: No tremor. Comments: Monofilament intact. Ankle reflexes normal bilaterally. No orders of the defined types were placed in this encounter. Current Outpatient Medications   Medication Sig Dispense Refill    TRESIBA FLEXTOUCH 100 UNIT/ML SOPN Inject 25 Units into the skin at bedtime 15 mL 11    NOVOLOG FLEXPEN 100 UNIT/ML injection pen Correction scale 1/50>150 (up to 20 units daily) 15 mL 11    glimepiride (AMARYL) 1 MG tablet Take 1 tablet by mouth every morning (before breakfast) 30 tablet 11    JARDIANCE 10 MG tablet Take 1 tablet by mouth daily 30 tablet 11    metFORMIN (GLUCOPHAGE) 1000 MG tablet Take 1 tablet by mouth 2 times daily (with meals) 60 tablet 11    ONETOUCH VERIO strip 2 times a day 100 each 11    Lancets (ONETOUCH DELICA PLUS [id number]) MISC 2 times a day 100 each 11    atorvastatin (LIPITOR) 20 MG tablet Take 1 tablet by mouth at bedtime 30 tablet 11    ibuprofen (ADVIL;MOTRIN) 800 MG tablet Take 800 mg by mouth every 6 hours as needed      medroxyPROGESTERone (DEPO-PROVERA) 150 MG/ML injection        No current facility-administered medications for this visit.

## 2022-11-22 ENCOUNTER — PATIENT MESSAGE (OUTPATIENT)
Dept: ENDOCRINOLOGY | Age: 42
End: 2022-11-22

## 2022-11-22 RX ORDER — FLUCONAZOLE 150 MG/1
150 TABLET ORAL ONCE
Qty: 1 TABLET | Refills: 1 | Status: SHIPPED | OUTPATIENT
Start: 2022-11-22 | End: 2022-11-22

## 2022-11-22 NOTE — TELEPHONE ENCOUNTER
Emily Room 11/22/2022 4:54 PM EST      ----- Message -----  From: Yovany Pino  Sent: 11/22/2022 6:59 AM EST  To: Nisha [facility] Endocrinology Clinical Staff  Subject: I need a prescription     hello good morning, I have a yeast infection could you send me something to my pharmacy to use, it hasn't happened to me for a long time I think it's because of the medicine but I've been fine I just hope it's not so constant, thanks

## 2023-01-30 ENCOUNTER — OFFICE VISIT (OUTPATIENT)
Dept: FAMILY MEDICINE CLINIC | Facility: CLINIC | Age: 43
End: 2023-01-30
Payer: COMMERCIAL

## 2023-01-30 VITALS
SYSTOLIC BLOOD PRESSURE: 122 MMHG | HEIGHT: 70 IN | RESPIRATION RATE: 19 BRPM | HEART RATE: 82 BPM | OXYGEN SATURATION: 98 % | TEMPERATURE: 96.7 F | WEIGHT: 263 LBS | BODY MASS INDEX: 37.65 KG/M2 | DIASTOLIC BLOOD PRESSURE: 75 MMHG

## 2023-01-30 DIAGNOSIS — E66.01 MORBID OBESITY (HCC): Primary | ICD-10-CM

## 2023-01-30 DIAGNOSIS — B37.31 YEAST VAGINITIS: ICD-10-CM

## 2023-01-30 PROCEDURE — 99214 OFFICE O/P EST MOD 30 MIN: CPT | Performed by: FAMILY MEDICINE

## 2023-01-30 RX ORDER — FLUCONAZOLE 150 MG/1
150 TABLET ORAL DAILY
Qty: 3 TABLET | Refills: 0 | Status: SHIPPED | OUTPATIENT
Start: 2023-01-30 | End: 2023-02-02

## 2023-01-30 ASSESSMENT — ENCOUNTER SYMPTOMS
SHORTNESS OF BREATH: 0
VOMITING: 0
COUGH: 0
DIARRHEA: 0
CONSTIPATION: 0

## 2023-01-30 ASSESSMENT — PATIENT HEALTH QUESTIONNAIRE - PHQ9
1. LITTLE INTEREST OR PLEASURE IN DOING THINGS: 0
SUM OF ALL RESPONSES TO PHQ QUESTIONS 1-9: 0
SUM OF ALL RESPONSES TO PHQ9 QUESTIONS 1 & 2: 0
2. FEELING DOWN, DEPRESSED OR HOPELESS: 0

## 2023-01-30 NOTE — PROGRESS NOTES
Anjum Clark (:  1980) is a 40 y.o. female,Established patient, here for evaluation of the following chief complaint(s):  Check-Up (Feels like she may have a yeast infection as well. Is having vaginal itching. Wants to talk about the bypass surgery as well, to help her loose weight.) and Other (Has had a eye exam in the last year, but not a foot exam. Has been over 10 years since the last Pneumonia shot.)         ASSESSMENT/PLAN:  1. Morbid obesity [facility])  -     [facility] - Zoe Lacey MD, Bariatric Surgery, [facility]  2. Yeast vaginitis  -     fluconazole (DIFLUCAN) 150 MG tablet; Take 1 tablet by mouth daily for 3 days, Disp-3 tablet, R-0Normal    No follow-ups on file. Subjective   SUBJECTIVE/OBJECTIVE:  HPI  Chief Complaint   Patient presents with    Check-Up     Feels like she may have a yeast infection as well. Is having vaginal itching. Wants to talk about the bypass surgery as well, to help her loose weight. Other     Has had a eye exam in the last year, but not a foot exam. Has been over 10 years since the last Pneumonia shot. Diabetes managed by endocrinology. Working on medication management. They tried to get her off of her tresiba and put her on ozempic, but it made her too sick to her stomach (diarrhea and stomach pain). She was also started on jardiance, but now she is back to having recurrent yeast infections. She reports she feels relatively well but does get very tired at the end of the day. Review of Systems   Constitutional:  Negative for diaphoresis and unexpected weight change. HENT:  Negative for congestion. Eyes:  Negative for visual disturbance. Respiratory:  Negative for cough and shortness of breath. Cardiovascular:  Negative for chest pain. Gastrointestinal:  Negative for constipation, diarrhea and vomiting. Genitourinary:  Negative for dysuria. Neurological:  Negative for headaches.    Psychiatric/Behavioral:  Negative for dysphoric mood and sleep disturbance. The patient is not nervous/anxious. Objective   Physical Exam  Vitals reviewed. Constitutional:       Appearance: Normal appearance. She is obese. HENT:      Head: Normocephalic. Eyes:      Extraocular Movements: Extraocular movements intact. Conjunctiva/sclera: Conjunctivae normal.      Pupils: Pupils are equal, round, and reactive to light. Cardiovascular:      Rate and Rhythm: Normal rate and regular rhythm. Heart sounds: Normal heart sounds. No murmur heard. Pulmonary:      Effort: Pulmonary effort is normal. No respiratory distress. Breath sounds: Normal breath sounds. Abdominal:      General: Bowel sounds are normal.      Palpations: Abdomen is soft. Musculoskeletal:         General: Normal range of motion. Skin:     General: Skin is warm and dry. Neurological:      General: No focal deficit present. Mental Status: She is alert. Psychiatric:         Mood and Affect: Mood normal.         Behavior: Behavior normal.   /75 (Site: Left Upper Arm, Position: Sitting, Cuff Size: Large Adult)   Pulse 82   Temp (!) 96.7 °F (35.9 °C)   Resp 19   Ht 5' 10\" (1.778 m)   Wt 263 lb (119.3 kg)   LMP 10/20/2022   SpO2 98%   BMI 37.74 kg/m²      An electronic signature was used to authenticate this note.     --Esdras Alfred MD

## 2023-04-03 PROBLEM — O24.113 PRE-EXISTING TYPE 2 DIABETES MELLITUS DURING PREGNANCY IN THIRD TRIMESTER: Status: RESOLVED | Noted: 2020-09-10 | Resolved: 2021-05-14

## 2023-04-25 ENCOUNTER — OFFICE VISIT (OUTPATIENT)
Dept: ENDOCRINOLOGY | Age: 43
End: 2023-04-25
Payer: COMMERCIAL

## 2023-04-25 VITALS — WEIGHT: 264 LBS | SYSTOLIC BLOOD PRESSURE: 116 MMHG | DIASTOLIC BLOOD PRESSURE: 70 MMHG | BODY MASS INDEX: 37.88 KG/M2

## 2023-04-25 DIAGNOSIS — E11.65 UNCONTROLLED TYPE 2 DIABETES MELLITUS WITH HYPERGLYCEMIA, WITHOUT LONG-TERM CURRENT USE OF INSULIN (HCC): Primary | ICD-10-CM

## 2023-04-25 DIAGNOSIS — E78.2 MIXED HYPERLIPIDEMIA: ICD-10-CM

## 2023-04-25 LAB — HBA1C MFR BLD: 8 %

## 2023-04-25 PROCEDURE — 99214 OFFICE O/P EST MOD 30 MIN: CPT | Performed by: INTERNAL MEDICINE

## 2023-04-25 PROCEDURE — 83036 HEMOGLOBIN GLYCOSYLATED A1C: CPT | Performed by: INTERNAL MEDICINE

## 2023-04-25 RX ORDER — BLOOD SUGAR DIAGNOSTIC
STRIP MISCELLANEOUS
Qty: 100 EACH | Refills: 11 | Status: SHIPPED | OUTPATIENT
Start: 2023-04-25

## 2023-04-25 RX ORDER — GLIMEPIRIDE 2 MG/1
2 TABLET ORAL
Qty: 30 TABLET | Refills: 11 | Status: SHIPPED | OUTPATIENT
Start: 2023-04-25

## 2023-04-25 RX ORDER — INSULIN ASPART 100 [IU]/ML
INJECTION, SOLUTION INTRAVENOUS; SUBCUTANEOUS
Qty: 15 ML | Refills: 11 | Status: SHIPPED | OUTPATIENT
Start: 2023-04-25

## 2023-04-25 RX ORDER — LANCETS 30 GAUGE
EACH MISCELLANEOUS
Qty: 100 EACH | Refills: 11 | Status: SHIPPED | OUTPATIENT
Start: 2023-04-25

## 2023-04-25 RX ORDER — ATORVASTATIN CALCIUM 20 MG/1
20 TABLET, FILM COATED ORAL NIGHTLY
Qty: 30 TABLET | Refills: 11 | Status: SHIPPED | OUTPATIENT
Start: 2023-04-25

## 2023-04-25 RX ORDER — EMPAGLIFLOZIN 25 MG/1
25 TABLET, FILM COATED ORAL DAILY
Qty: 30 TABLET | Refills: 11 | Status: SHIPPED | OUTPATIENT
Start: 2023-04-25

## 2023-04-25 RX ORDER — INSULIN DEGLUDEC INJECTION 100 U/ML
20 INJECTION, SOLUTION SUBCUTANEOUS NIGHTLY
Qty: 15 ML | Refills: 11 | Status: SHIPPED | OUTPATIENT
Start: 2023-04-25

## 2023-04-25 ASSESSMENT — ENCOUNTER SYMPTOMS
SHORTNESS OF BREATH: 0
CONSTIPATION: 0
DIARRHEA: 0

## 2023-04-25 NOTE — PROGRESS NOTES
She is planning to have bariatric surgery (gastric bypass) in [facility] in 6/2023. Respiratory:  Negative for shortness of breath. Cardiovascular:  Negative for chest pain. Gastrointestinal:  Negative for constipation and diarrhea. Vital Signs:  /70 (Site: Right Upper Arm, Position: Sitting)   Wt 264 lb (119.7 kg)   BMI 37.88 kg/m²     Wt Readings from Last 3 Encounters:   04/25/23 264 lb (119.7 kg)   01/30/23 263 lb (119.3 kg)   11/03/22 260 lb (117.9 kg)       Physical Exam  Constitutional:       General: She is not in acute distress. Neck:      Thyroid: No thyroid mass or thyromegaly. Cardiovascular:      Rate and Rhythm: Normal rate and regular rhythm. Comments: DP pulses 2+ bilaterally. No edema. Lymphadenopathy:      Cervical: No cervical adenopathy. Skin:     Comments: No lesions on feet. Neurological:      Motor: No tremor. Comments: Monofilament intact. Ankle reflexes normal bilaterally.        Orders Placed This Encounter   Procedures    Hemoglobin A1C     Standing Status:   Future     Standing Expiration Date:   4/25/2024    Comprehensive Metabolic Panel     Standing Status:   Future     Standing Expiration Date:   4/25/2024    Lipid Panel     Standing Status:   Future     Standing Expiration Date:   4/25/2024    Microalbumin / Creatinine Urine Ratio     Standing Status:   Future     Standing Expiration Date:   4/25/2024    TSH with Reflex     Standing Status:   Future     Standing Expiration Date:   4/25/2024    AMB POC HEMOGLOBIN A1C       Current Outpatient Medications   Medication Sig Dispense Refill    TRESIBA FLEXTOUCH 100 UNIT/ML SOPN Inject 20 Units into the skin at bedtime 15 mL 11    NOVOLOG FLEXPEN 100 UNIT/ML injection pen Correction scale 1/50>150 (up to 20 units daily) 15 mL 11    metFORMIN (GLUCOPHAGE) 1000 MG tablet Take 1 tablet by mouth 2 times daily (with meals) 60 tablet 11    JARDIANCE 25 MG tablet Take 1 tablet by mouth daily 30 tablet 11

## 2023-09-15 DIAGNOSIS — E11.65 UNCONTROLLED TYPE 2 DIABETES MELLITUS WITH HYPERGLYCEMIA, WITHOUT LONG-TERM CURRENT USE OF INSULIN (HCC): ICD-10-CM

## 2023-09-15 DIAGNOSIS — E78.2 MIXED HYPERLIPIDEMIA: ICD-10-CM

## 2023-09-15 LAB
ALBUMIN SERPL-MCNC: 3.9 G/DL (ref 3.5–5)
ALBUMIN/GLOB SERPL: 1.5 (ref 0.4–1.6)
ALP SERPL-CCNC: 76 U/L (ref 50–136)
ALT SERPL-CCNC: 31 U/L (ref 12–65)
ANION GAP SERPL CALC-SCNC: 7 MMOL/L (ref 2–11)
AST SERPL-CCNC: 21 U/L (ref 15–37)
BILIRUB SERPL-MCNC: 0.5 MG/DL (ref 0.2–1.1)
BUN SERPL-MCNC: 24 MG/DL (ref 6–23)
CALCIUM SERPL-MCNC: 9.2 MG/DL (ref 8.3–10.4)
CHLORIDE SERPL-SCNC: 111 MMOL/L (ref 101–110)
CHOLEST SERPL-MCNC: 156 MG/DL
CO2 SERPL-SCNC: 27 MMOL/L (ref 21–32)
CREAT SERPL-MCNC: 0.8 MG/DL (ref 0.6–1)
CREAT UR-MCNC: 265 MG/DL
EST. AVERAGE GLUCOSE BLD GHB EST-MCNC: 163 MG/DL
GLOBULIN SER CALC-MCNC: 2.6 G/DL (ref 2.8–4.5)
GLUCOSE SERPL-MCNC: 81 MG/DL (ref 65–100)
HBA1C MFR BLD: 7.3 % (ref 4.8–5.6)
HDLC SERPL-MCNC: 50 MG/DL (ref 40–60)
HDLC SERPL: 3.1
LDLC SERPL CALC-MCNC: 88 MG/DL
MICROALBUMIN UR-MCNC: 1.33 MG/DL
MICROALBUMIN/CREAT UR-RTO: 5 MG/G (ref 0–30)
POTASSIUM SERPL-SCNC: 3.9 MMOL/L (ref 3.5–5.1)
PROT SERPL-MCNC: 6.5 G/DL (ref 6.3–8.2)
SODIUM SERPL-SCNC: 145 MMOL/L (ref 133–143)
TRIGL SERPL-MCNC: 90 MG/DL (ref 35–150)
TSH W FREE THYROID IF ABNORMAL: 1.83 UIU/ML (ref 0.36–3.74)
VLDLC SERPL CALC-MCNC: 18 MG/DL (ref 6–23)

## 2023-09-20 ENCOUNTER — OFFICE VISIT (OUTPATIENT)
Dept: ENDOCRINOLOGY | Age: 43
End: 2023-09-20
Payer: COMMERCIAL

## 2023-09-20 VITALS
WEIGHT: 233 LBS | BODY MASS INDEX: 33.43 KG/M2 | OXYGEN SATURATION: 99 % | SYSTOLIC BLOOD PRESSURE: 110 MMHG | DIASTOLIC BLOOD PRESSURE: 70 MMHG | HEART RATE: 61 BPM

## 2023-09-20 DIAGNOSIS — E11.65 UNCONTROLLED TYPE 2 DIABETES MELLITUS WITH HYPERGLYCEMIA, WITHOUT LONG-TERM CURRENT USE OF INSULIN (HCC): Primary | ICD-10-CM

## 2023-09-20 DIAGNOSIS — E78.2 MIXED HYPERLIPIDEMIA: ICD-10-CM

## 2023-09-20 PROCEDURE — 99214 OFFICE O/P EST MOD 30 MIN: CPT | Performed by: INTERNAL MEDICINE

## 2023-09-20 PROCEDURE — 3051F HG A1C>EQUAL 7.0%<8.0%: CPT | Performed by: INTERNAL MEDICINE

## 2023-09-20 RX ORDER — BLOOD SUGAR DIAGNOSTIC
STRIP MISCELLANEOUS
Qty: 100 EACH | Refills: 11 | Status: SHIPPED | OUTPATIENT
Start: 2023-09-20

## 2023-09-20 RX ORDER — ATORVASTATIN CALCIUM 20 MG/1
20 TABLET, FILM COATED ORAL NIGHTLY
Qty: 30 TABLET | Refills: 11 | Status: SHIPPED | OUTPATIENT
Start: 2023-09-20

## 2023-09-20 RX ORDER — GLIMEPIRIDE 1 MG/1
1 TABLET ORAL
Qty: 30 TABLET | Refills: 11 | Status: SHIPPED | OUTPATIENT
Start: 2023-09-20

## 2023-09-20 RX ORDER — LANCETS 30 GAUGE
EACH MISCELLANEOUS
Qty: 100 EACH | Refills: 11 | Status: SHIPPED | OUTPATIENT
Start: 2023-09-20

## 2023-09-20 RX ORDER — EMPAGLIFLOZIN 25 MG/1
25 TABLET, FILM COATED ORAL DAILY
Qty: 30 TABLET | Refills: 11 | Status: SHIPPED | OUTPATIENT
Start: 2023-09-20

## 2023-09-20 RX ORDER — INSULIN ASPART 100 [IU]/ML
INJECTION, SOLUTION INTRAVENOUS; SUBCUTANEOUS
Qty: 15 ML | Refills: 11 | Status: SHIPPED | OUTPATIENT
Start: 2023-09-20

## 2023-09-20 ASSESSMENT — ENCOUNTER SYMPTOMS
SHORTNESS OF BREATH: 0
CONSTIPATION: 0
DIARRHEA: 0

## 2023-09-20 NOTE — PROGRESS NOTES
Bj England MD, [facility] Endocrinology and Thyroid Nodule Clinic  [address] [facility], [address] [place] [address], [address]  Phone [phone]          Chau Ramirez is a 44 y.o. female seen 9/20/2023 for follow-up of type 2 diabetes mellitus        ASSESSMENT AND PLAN:    1. Uncontrolled type 2 diabetes mellitus with hyperglycemia, without long-term current use of insulin (HCC)  Her glycemic control is improving following gastric bypass surgery and her hemoglobin A1c is getting closer to goal less than 7. She is having some occasional hypoglycemia and I will decrease her glimepiride as below. Eye exam negative 9/2023. Urine microalbumin negative 9/2023.      - metFORMIN (GLUCOPHAGE) 1000 MG tablet; Take 1 tablet by mouth 2 times daily (with meals)  Dispense: 60 tablet; Refill: 11  - JARDIANCE 25 MG tablet; Take 1 tablet by mouth daily  Dispense: 30 tablet; Refill: 11  - glimepiride (AMARYL) 1 MG tablet; Take 1 tablet by mouth every morning (before breakfast)  Dispense: 30 tablet; Refill: 11  - NOVOLOG FLEXPEN 100 UNIT/ML injection pen; Correction scale 1/50>150 (up to 20 units daily)  Dispense: 15 mL; Refill: 11  - ONETOUCH VERIO strip; 2 times a day  Dispense: 100 each; Refill: 11  - Lancets ([address]) MISC; 2 times a day  Dispense: 100 each; Refill: 11    2. Mixed hyperlipidemia  LDL not at goal less than 70 9/2023 but I anticipate it will improve with weight loss. - atorvastatin (LIPITOR) 20 MG tablet; Take 1 tablet by mouth at bedtime  Dispense: 30 tablet; Refill: 11      Follow-up and Dispositions    Return in about 4 months (around 1/20/2024). HISTORY OF PRESENT ILLNESS:    DIABETES MELLITUS     Diagnosis: Type 2 diabetes mellitus diagnosed in 2005. Diet: Small meals every 3 hours during the day. No sweet tea, juice or regular soft drinks. She avoids sweets for the most part. She is trying to limit carbohydrates.      Exercise: She

## 2023-10-13 ENCOUNTER — PATIENT MESSAGE (OUTPATIENT)
Dept: ENDOCRINOLOGY | Age: 43
End: 2023-10-13

## 2023-10-13 RX ORDER — FLUCONAZOLE 150 MG/1
150 TABLET ORAL ONCE
Qty: 1 TABLET | Refills: 0 | Status: SHIPPED | OUTPATIENT
Start: 2023-10-13 | End: 2023-10-13

## 2023-10-13 NOTE — TELEPHONE ENCOUNTER
I see in the past she has had yeast infection since starting jardiance. Sending to [facility] due to [facility] being out.

## 2024-02-23 DIAGNOSIS — E11.65 UNCONTROLLED TYPE 2 DIABETES MELLITUS WITH HYPERGLYCEMIA, WITHOUT LONG-TERM CURRENT USE OF INSULIN (HCC): ICD-10-CM

## 2024-02-23 DIAGNOSIS — E78.2 MIXED HYPERLIPIDEMIA: ICD-10-CM

## 2024-02-23 LAB
ALBUMIN SERPL-MCNC: 3.7 G/DL (ref 3.5–5)
ALBUMIN/GLOB SERPL: 1.4 (ref 0.4–1.6)
ALP SERPL-CCNC: 94 U/L (ref 50–136)
ALT SERPL-CCNC: 36 U/L (ref 12–65)
ANION GAP SERPL CALC-SCNC: 2 MMOL/L (ref 2–11)
AST SERPL-CCNC: 23 U/L (ref 15–37)
BILIRUB SERPL-MCNC: 0.3 MG/DL (ref 0.2–1.1)
BUN SERPL-MCNC: 28 MG/DL (ref 6–23)
CALCIUM SERPL-MCNC: 8.9 MG/DL (ref 8.3–10.4)
CHLORIDE SERPL-SCNC: 113 MMOL/L (ref 103–113)
CHOLEST SERPL-MCNC: 163 MG/DL
CO2 SERPL-SCNC: 27 MMOL/L (ref 21–32)
CREAT SERPL-MCNC: 0.6 MG/DL (ref 0.6–1)
EST. AVERAGE GLUCOSE BLD GHB EST-MCNC: 200 MG/DL
GLOBULIN SER CALC-MCNC: 2.7 G/DL (ref 2.8–4.5)
GLUCOSE SERPL-MCNC: 101 MG/DL (ref 65–100)
HBA1C MFR BLD: 8.6 % (ref 4.8–5.6)
HDLC SERPL-MCNC: 59 MG/DL (ref 40–60)
HDLC SERPL: 2.8
LDLC SERPL CALC-MCNC: 89 MG/DL
POTASSIUM SERPL-SCNC: 4.4 MMOL/L (ref 3.5–5.1)
PROT SERPL-MCNC: 6.4 G/DL (ref 6.3–8.2)
SODIUM SERPL-SCNC: 142 MMOL/L (ref 136–146)
TRIGL SERPL-MCNC: 75 MG/DL (ref 35–150)
VLDLC SERPL CALC-MCNC: 15 MG/DL (ref 6–23)

## 2024-02-28 ENCOUNTER — OFFICE VISIT (OUTPATIENT)
Dept: ENDOCRINOLOGY | Age: 44
End: 2024-02-28
Payer: COMMERCIAL

## 2024-02-28 VITALS
BODY MASS INDEX: 29.84 KG/M2 | SYSTOLIC BLOOD PRESSURE: 110 MMHG | DIASTOLIC BLOOD PRESSURE: 75 MMHG | HEART RATE: 63 BPM | OXYGEN SATURATION: 98 % | WEIGHT: 208 LBS

## 2024-02-28 DIAGNOSIS — E78.2 MIXED HYPERLIPIDEMIA: ICD-10-CM

## 2024-02-28 DIAGNOSIS — E11.65 UNCONTROLLED TYPE 2 DIABETES MELLITUS WITH HYPERGLYCEMIA, WITHOUT LONG-TERM CURRENT USE OF INSULIN (HCC): Primary | ICD-10-CM

## 2024-02-28 PROCEDURE — 3052F HG A1C>EQUAL 8.0%<EQUAL 9.0%: CPT | Performed by: INTERNAL MEDICINE

## 2024-02-28 PROCEDURE — 99214 OFFICE O/P EST MOD 30 MIN: CPT | Performed by: INTERNAL MEDICINE

## 2024-02-28 RX ORDER — TIRZEPATIDE 2.5 MG/.5ML
2.5 INJECTION, SOLUTION SUBCUTANEOUS WEEKLY
Qty: 2 ML | Refills: 0 | Status: SHIPPED | OUTPATIENT
Start: 2024-02-28 | End: 2024-03-21

## 2024-02-28 RX ORDER — BLOOD SUGAR DIAGNOSTIC
STRIP MISCELLANEOUS
Qty: 100 EACH | Refills: 11 | Status: SHIPPED | OUTPATIENT
Start: 2024-02-28

## 2024-02-28 RX ORDER — LANCETS 30 GAUGE
EACH MISCELLANEOUS
Qty: 100 EACH | Refills: 11 | Status: SHIPPED | OUTPATIENT
Start: 2024-02-28

## 2024-02-28 RX ORDER — ATORVASTATIN CALCIUM 40 MG/1
40 TABLET, FILM COATED ORAL NIGHTLY
Qty: 30 TABLET | Refills: 11 | Status: SHIPPED | OUTPATIENT
Start: 2024-02-28

## 2024-02-28 RX ORDER — TIRZEPATIDE 5 MG/.5ML
5 INJECTION, SOLUTION SUBCUTANEOUS WEEKLY
Qty: 2 ML | Refills: 11 | Status: SHIPPED | OUTPATIENT
Start: 2024-02-28

## 2024-02-28 ASSESSMENT — ENCOUNTER SYMPTOMS
DIARRHEA: 0
SHORTNESS OF BREATH: 0
CONSTIPATION: 0

## 2024-02-28 NOTE — PROGRESS NOTES
Bj Saravia MD, FACE  [facility]  [address] [place] [address], [facility] [address]  [place] [address]  Phone [phone]  Facsimile [phone]          Yusra Crisostomo is a 43 y.o. female seen 2/28/2024 for follow-up of type 2 diabetes mellitus        ASSESSMENT AND PLAN:    1. Uncontrolled type 2 diabetes mellitus with hyperglycemia, without long-term current use of insulin (HCC)  Her glycemic control is suboptimal and her hemoglobin A1c is not at goal less than 7.  I will stop her Jardiance due to frequent yeast infections.  I will have her try Mounjaro as below.  I will have her try stopping her glimepiride due to her frequent fasting hypoglycemia.  Eye exam negative 9/2023.  Urine microalbumin negative 9/2023.  Update labs prior to next visit.     - metFORMIN (GLUCOPHAGE) 1000 MG tablet; Take 1 tablet by mouth 2 times daily (with meals)  Dispense: 60 tablet; Refill: 11  - MOUNJARO 2.5 MG/0.5ML SOPN SC injection; Inject 0.5 mLs into the skin once a week for 4 doses  Dispense: 2 mL; Refill: 0  - MOUNJARO 5 MG/0.5ML SOPN SC injection; Inject 0.5 mLs into the skin once a week Start after completing Mounjaro 2.5 mg weekly for 4 weeks  Dispense: 2 mL; Refill: 11  - ONETOUCH VERIO strip; 2 times a day  Dispense: 100 each; Refill: 11  - Lancets (ONETOUCH DELICA PLUS [id number]) MISC; 2 times a day  Dispense: 100 each; Refill: 11    2. Mixed hyperlipidemia  LDL not at goal less than 70 2/2024 and I will increase her atorvastatin as below.      - atorvastatin (LIPITOR) 40 MG tablet; Take 1 tablet by mouth at bedtime  Dispense: 30 tablet; Refill: 11      Follow-up and Dispositions    Return in about 4 months (around 6/28/2024).         HISTORY OF PRESENT ILLNESS:    DIABETES MELLITUS     Diagnosis: Type 2 diabetes mellitus diagnosed in 2005.     Diet: Small meals every 3-4 hours during the day.  No sweet tea, juice or regular soft drinks.  She avoids sweets for the most part.  She is trying

## 2024-10-23 DIAGNOSIS — E11.65 UNCONTROLLED TYPE 2 DIABETES MELLITUS WITH HYPERGLYCEMIA, WITHOUT LONG-TERM CURRENT USE OF INSULIN (HCC): ICD-10-CM

## 2024-10-23 DIAGNOSIS — E78.2 MIXED HYPERLIPIDEMIA: ICD-10-CM

## 2024-10-23 LAB
ALBUMIN SERPL-MCNC: 3.6 G/DL (ref 3.5–5)
ALBUMIN/GLOB SERPL: 1.3 (ref 1–1.9)
ALP SERPL-CCNC: 77 U/L (ref 35–104)
ALT SERPL-CCNC: 33 U/L (ref 8–45)
ANION GAP SERPL CALC-SCNC: 8 MMOL/L (ref 9–18)
AST SERPL-CCNC: 42 U/L (ref 15–37)
BILIRUB SERPL-MCNC: 0.3 MG/DL (ref 0–1.2)
BUN SERPL-MCNC: 23 MG/DL (ref 6–23)
CALCIUM SERPL-MCNC: 9 MG/DL (ref 8.8–10.2)
CHLORIDE SERPL-SCNC: 107 MMOL/L (ref 98–107)
CHOLEST SERPL-MCNC: 153 MG/DL (ref 0–200)
CO2 SERPL-SCNC: 27 MMOL/L (ref 20–28)
CREAT SERPL-MCNC: 0.67 MG/DL (ref 0.6–1.1)
CREAT UR-MCNC: 87.5 MG/DL (ref 28–217)
EST. AVERAGE GLUCOSE BLD GHB EST-MCNC: 210 MG/DL
GLOBULIN SER CALC-MCNC: 2.8 G/DL (ref 2.3–3.5)
GLUCOSE SERPL-MCNC: 65 MG/DL (ref 70–99)
HBA1C MFR BLD: 9 % (ref 0–5.6)
HDLC SERPL-MCNC: 72 MG/DL (ref 40–60)
HDLC SERPL: 2.1 (ref 0–5)
LDLC SERPL CALC-MCNC: 73 MG/DL (ref 0–100)
MICROALBUMIN UR-MCNC: <1.2 MG/DL (ref 0–20)
MICROALBUMIN/CREAT UR-RTO: NORMAL MG/G (ref 0–30)
POTASSIUM SERPL-SCNC: 4.3 MMOL/L (ref 3.5–5.1)
PROT SERPL-MCNC: 6.3 G/DL (ref 6.3–8.2)
SODIUM SERPL-SCNC: 142 MMOL/L (ref 136–145)
TRIGL SERPL-MCNC: 41 MG/DL (ref 0–150)
TSH W FREE THYROID IF ABNORMAL: 2.32 UIU/ML (ref 0.27–4.2)
VLDLC SERPL CALC-MCNC: 8 MG/DL (ref 6–23)

## 2024-10-28 ASSESSMENT — ENCOUNTER SYMPTOMS
CONSTIPATION: 0
SHORTNESS OF BREATH: 0
DIARRHEA: 0

## 2024-10-28 NOTE — PROGRESS NOTES
Bj Saravia MD, FACE  [facility]  [address] [place] [address], [address]  [place] [address]  Phone [phone]  Facsimile [phone]          Yusra Crisostomo is a 44 y.o. female seen 10/29/2024 for follow-up of type 2 diabetes mellitus        ASSESSMENT AND PLAN:    1. Uncontrolled type 2 diabetes mellitus with hyperglycemia, without long-term current use of insulin (HCC)  Her hemoglobin A1c is elevated, but she states that she was off Mounjaro for 7 months.  She just restarted her Mounjaro 2 weeks ago, and her blood glucoses have been much lower.  According to her glucometer, she is having frequent fasting hypoglycemia.  She states that she does not have any hypoglycemic symptoms, however, and I suspect that her glucometer is not accurate.  I will provide her with a prescription for a new glucometer today.  She will continue to titrate her Mounjaro up to 5 mg weekly once she has completed 4 weeks of the 2.5 mg dose.  Eye exam negative 9/2023 and I instructed her to make an appointment.  Urine microalbumin negative 10/2024.    - MOUNJARO 5 MG/0.5ML SOAJ; Inject 5 mg into the skin once a week  Dispense: 6 mL; Refill: 3  - metFORMIN (GLUCOPHAGE) 1000 MG tablet; Take 1 tablet by mouth 2 times daily (with meals)  Dispense: 180 tablet; Refill: 3  - ONETOUCH VERIO strip; 2 times a day  Dispense: 200 each; Refill: 3  - Lancets (ONETOUCH DELICA PLUS [id number]) MISC; 2 times a day  Dispense: 200 each; Refill: 3  - Blood Glucose Monitoring Suppl (ONETOUCH VERIO REFLECT) w/Device KIT; Use as directed  Dispense: 1 kit; Refill: 0    2. Mixed hyperlipidemia  LDL not quite at goal less than 70 10/2024.    - atorvastatin (LIPITOR) 40 MG tablet; Take 1 tablet by mouth at bedtime  Dispense: 90 tablet; Refill: 3      Follow-up and Dispositions    Return in about 4 months (around 2/28/2025).         HISTORY OF PRESENT ILLNESS:    DIABETES MELLITUS     Diagnosis: Type 2 diabetes mellitus

## 2024-10-29 ENCOUNTER — OFFICE VISIT (OUTPATIENT)
Dept: ENDOCRINOLOGY | Age: 44
End: 2024-10-29
Payer: COMMERCIAL

## 2024-10-29 VITALS
HEART RATE: 72 BPM | BODY MASS INDEX: 25.77 KG/M2 | SYSTOLIC BLOOD PRESSURE: 110 MMHG | HEIGHT: 70 IN | WEIGHT: 180 LBS | OXYGEN SATURATION: 98 % | DIASTOLIC BLOOD PRESSURE: 72 MMHG | RESPIRATION RATE: 16 BRPM

## 2024-10-29 DIAGNOSIS — E78.2 MIXED HYPERLIPIDEMIA: ICD-10-CM

## 2024-10-29 DIAGNOSIS — E11.65 UNCONTROLLED TYPE 2 DIABETES MELLITUS WITH HYPERGLYCEMIA, WITHOUT LONG-TERM CURRENT USE OF INSULIN (HCC): Primary | ICD-10-CM

## 2024-10-29 PROCEDURE — 99214 OFFICE O/P EST MOD 30 MIN: CPT | Performed by: INTERNAL MEDICINE

## 2024-10-29 PROCEDURE — 3052F HG A1C>EQUAL 8.0%<EQUAL 9.0%: CPT | Performed by: INTERNAL MEDICINE

## 2024-10-29 RX ORDER — BLOOD SUGAR DIAGNOSTIC
STRIP MISCELLANEOUS
Qty: 200 EACH | Refills: 3 | Status: SHIPPED | OUTPATIENT
Start: 2024-10-29

## 2024-10-29 RX ORDER — LANCETS 30 GAUGE
EACH MISCELLANEOUS
Qty: 200 EACH | Refills: 3 | Status: SHIPPED | OUTPATIENT
Start: 2024-10-29

## 2024-10-29 RX ORDER — TIRZEPATIDE 5 MG/.5ML
5 INJECTION, SOLUTION SUBCUTANEOUS WEEKLY
Qty: 6 ML | Refills: 3 | Status: SHIPPED | OUTPATIENT
Start: 2024-10-29

## 2024-10-29 RX ORDER — ATORVASTATIN CALCIUM 40 MG/1
40 TABLET, FILM COATED ORAL NIGHTLY
Qty: 90 TABLET | Refills: 3 | Status: SHIPPED | OUTPATIENT
Start: 2024-10-29

## 2024-10-29 RX ORDER — BLOOD-GLUCOSE METER
KIT MISCELLANEOUS
Qty: 1 KIT | Refills: 0 | Status: SHIPPED | OUTPATIENT
Start: 2024-10-29

## 2024-10-29 ASSESSMENT — ENCOUNTER SYMPTOMS
VOMITING: 0
NAUSEA: 0

## 2024-11-26 ENCOUNTER — TELEPHONE (OUTPATIENT)
Dept: ENDOCRINOLOGY | Age: 44
End: 2024-11-26

## 2024-11-26 NOTE — TELEPHONE ENCOUNTER
PT NOTIFIED ON MYCHART    11/26/2024  Bj Saravia [address] [place] [id number]  Plan member ID: [id number]  Case number: [id number]  Prescriber name: Bj Saravia  Prescriber fax: [phone]  NOTICE OF APPROVAL  Dear Yusra Crisostomo,  On 11/26/2024, we received a request from your provider for coverage of Mounjaro Inj 5mg/0.5.  Your request has been approved.  The information in this letter does not guarantee payment or represent a treatment decision.  Treatment decisions are made between you and your provider.  How long does this approval last?  MOUNJARO INJ 5MG/0.5, use as directed, is approved through 11/26/2025. Please note: Doses/  quantities above plan limits and/or maximum Food and Drug Administration (FDA) approved  dosing may be subject to further review.  What happens when the authorization expires?  It is recommended that your provider contact us for continued authorization before your  authorization expires so that you do not experience any disruption in therapy.  Thank you for your patience and understanding during the review process.  If you have any questions, please call us toll-free at [phone]. If you have a hearing or  speech impairment, please call .  Sincerely,  [facility]

## 2025-01-20 ENCOUNTER — TELEPHONE (OUTPATIENT)
Dept: ENDOCRINOLOGY | Age: 45
End: 2025-01-20

## 2025-01-20 NOTE — TELEPHONE ENCOUNTER
Pt has new insurance, has not provided new ins information or uploaded card.   Old insurance is what remains in system.  Pt is not verifiable, cannot proceed w PA at this time.  Have requested new card info x2.

## 2025-01-23 ENCOUNTER — TELEPHONE (OUTPATIENT)
Dept: ENDOCRINOLOGY | Age: 45
End: 2025-01-23

## 2025-01-23 DIAGNOSIS — E11.65 UNCONTROLLED TYPE 2 DIABETES MELLITUS WITH HYPERGLYCEMIA, WITHOUT LONG-TERM CURRENT USE OF INSULIN (HCC): ICD-10-CM

## 2025-01-30 NOTE — TELEPHONE ENCOUNTER
Mounjaro approval, fax incoming.  Pt notified.      sami olguin (Key: [id number])  PA Case ID #: [id number]  Need Help? Call us at [phone]  Outcome  Approved today by [employer] 2017  CaseId:[id number];Status:Approved;Review Type:Prior Auth;Coverage Start Date:01/01/2025;Coverage End Date:01/30/2026;  Authorization Expiration Date: 1/29/2026  Drug  Mounjaro 5MG/0.5ML auto-injectors    Form  [employer] Electronic PA Form (2017 NCPDP)

## 2025-02-14 ENCOUNTER — TELEMEDICINE (OUTPATIENT)
Dept: INTERNAL MEDICINE CLINIC | Facility: CLINIC | Age: 45
End: 2025-02-14
Payer: COMMERCIAL

## 2025-02-14 DIAGNOSIS — E66.3 OVERWEIGHT (BMI 25.0-29.9): ICD-10-CM

## 2025-02-14 DIAGNOSIS — N89.8 VAGINAL DISCHARGE: ICD-10-CM

## 2025-02-14 DIAGNOSIS — E78.2 MIXED HYPERLIPIDEMIA: ICD-10-CM

## 2025-02-14 DIAGNOSIS — Z76.89 ENCOUNTER TO ESTABLISH CARE: ICD-10-CM

## 2025-02-14 DIAGNOSIS — E11.65 UNCONTROLLED TYPE 2 DIABETES MELLITUS WITH HYPERGLYCEMIA, WITHOUT LONG-TERM CURRENT USE OF INSULIN (HCC): Primary | ICD-10-CM

## 2025-02-14 PROCEDURE — 99213 OFFICE O/P EST LOW 20 MIN: CPT | Performed by: FAMILY MEDICINE

## 2025-02-14 SDOH — ECONOMIC STABILITY: FOOD INSECURITY: WITHIN THE PAST 12 MONTHS, YOU WORRIED THAT YOUR FOOD WOULD RUN OUT BEFORE YOU GOT MONEY TO BUY MORE.: NEVER TRUE

## 2025-02-14 SDOH — ECONOMIC STABILITY: FOOD INSECURITY: WITHIN THE PAST 12 MONTHS, THE FOOD YOU BOUGHT JUST DIDN'T LAST AND YOU DIDN'T HAVE MONEY TO GET MORE.: NEVER TRUE

## 2025-02-14 ASSESSMENT — PATIENT HEALTH QUESTIONNAIRE - PHQ9
SUM OF ALL RESPONSES TO PHQ9 QUESTIONS 1 & 2: 0
2. FEELING DOWN, DEPRESSED OR HOPELESS: NOT AT ALL
SUM OF ALL RESPONSES TO PHQ QUESTIONS 1-9: 0
1. LITTLE INTEREST OR PLEASURE IN DOING THINGS: NOT AT ALL
SUM OF ALL RESPONSES TO PHQ QUESTIONS 1-9: 0

## 2025-02-14 NOTE — PROGRESS NOTES
Yusra Crisostomo, was evaluated through a synchronous (real-time) audio-video encounter. The patient (or guardian if applicable) is aware that this is a billable service, which includes applicable co-pays. This Virtual Visit was conducted with patient's (and/or legal guardian's) consent. Patient identification was verified, and a caregiver was present when appropriate.   The patient was located at Home: [address] [place] [address]  Provider was located at Home (Appt Dept State): [place]  Confirm you are appropriately licensed, registered, or certified to deliver care in the state where the patient is located as indicated above. If you are not or unsure, please re-schedule the visit: Yes, I confirm.     Yusra Crisostomo (:  1980) is a Established patient, presenting virtually for evaluation of the following:      Below is the assessment and plan developed based on review of pertinent history, physical exam, labs, studies, and medications.     Assessment & Plan    1. Uncontrolled type 2 diabetes mellitus with hyperglycemia, without long-term current use of insulin (HCC)  Overview:  Tolerating medication well and achieving desired therapeutic response.  Will continue with:   Diabetic Medications       Insulin       NOVOLOG FLEXPEN 100 UNIT/ML injection pen Correction scale 1/50>150 (up to 20 units daily)       Biguanides       metFORMIN (GLUCOPHAGE) 1000 MG tablet Take 1 tablet by mouth 2 times daily (with meals)       Incretin Mimetic Agents       MOUNJARO 5 MG/0.5ML SOAJ Inject 5 mg into the skin once a week        .  A1c levels reviewed--will recheck. Encourage routine foot care. Recommend routine eye exams.  Encourage diet and exercise and medication adherence.   Follows with endocrinology    2. Mixed hyperlipidemia  Overview:  Statin: yes.  Tolerating medication well and achieving desired therapeutic response. Will continue with lipitor. Will recheck lipid levels. Encourage healthy eating and exercise as

## 2025-02-24 PROBLEM — N89.8 VAGINAL DISCHARGE: Status: ACTIVE | Noted: 2025-02-24

## 2025-03-03 ENCOUNTER — OFFICE VISIT (OUTPATIENT)
Dept: INTERNAL MEDICINE CLINIC | Facility: CLINIC | Age: 45
End: 2025-03-03
Payer: COMMERCIAL

## 2025-03-03 VITALS
HEART RATE: 80 BPM | WEIGHT: 150 LBS | DIASTOLIC BLOOD PRESSURE: 73 MMHG | BODY MASS INDEX: 22.22 KG/M2 | TEMPERATURE: 98 F | HEIGHT: 69 IN | OXYGEN SATURATION: 99 % | SYSTOLIC BLOOD PRESSURE: 108 MMHG

## 2025-03-03 DIAGNOSIS — E11.65 UNCONTROLLED TYPE 2 DIABETES MELLITUS WITH HYPERGLYCEMIA, WITHOUT LONG-TERM CURRENT USE OF INSULIN (HCC): ICD-10-CM

## 2025-03-03 DIAGNOSIS — N89.8 VAGINAL DISCHARGE: ICD-10-CM

## 2025-03-03 DIAGNOSIS — E78.2 MIXED HYPERLIPIDEMIA: ICD-10-CM

## 2025-03-03 DIAGNOSIS — Z12.4 PAP SMEAR FOR CERVICAL CANCER SCREENING: ICD-10-CM

## 2025-03-03 DIAGNOSIS — E66.3 OVERWEIGHT (BMI 25.0-29.9): ICD-10-CM

## 2025-03-03 DIAGNOSIS — N89.8 VAGINAL DISCHARGE: Primary | ICD-10-CM

## 2025-03-03 PROCEDURE — 99213 OFFICE O/P EST LOW 20 MIN: CPT | Performed by: FAMILY MEDICINE

## 2025-03-03 ASSESSMENT — PATIENT HEALTH QUESTIONNAIRE - PHQ9
1. LITTLE INTEREST OR PLEASURE IN DOING THINGS: NOT AT ALL
SUM OF ALL RESPONSES TO PHQ QUESTIONS 1-9: 0
SUM OF ALL RESPONSES TO PHQ QUESTIONS 1-9: 0
2. FEELING DOWN, DEPRESSED OR HOPELESS: NOT AT ALL
SUM OF ALL RESPONSES TO PHQ QUESTIONS 1-9: 0
SUM OF ALL RESPONSES TO PHQ QUESTIONS 1-9: 0

## 2025-03-03 NOTE — PROGRESS NOTES
Taniya Ramos DO  [facility] [place] [address]  Phone [phone]  Fax [phone]      Yusra Crisostomo (: 1980) is a 44 y.o. female, here for evaluation of the following chief complaint(s):  Follow-up and Vaginal Discharge (Intermittent white vaginal discharge and skin dryness )       ASSESSMENT/PLAN:  1. Vaginal discharge  Overview:  Differential diagnosis discussed  PE findings discussed with patient  Will refer to ob/gyn for further workup and management s/p results  Orders:  -     IGP, Aptima HPV; Future  -     [facility]  2. Pap smear for cervical cancer screening  -     IGP, Aptima HPV; Future  3. Uncontrolled type 2 diabetes mellitus with hyperglycemia, without long-term current use of insulin (HCC)  Overview:  Tolerating medication well and achieving desired therapeutic response.  Will continue with:   Diabetic Medications       Insulin       NOVOLOG FLEXPEN 100 UNIT/ML injection pen Correction scale 1/50>150 (up to 20 units daily)       Biguanides       metFORMIN (GLUCOPHAGE) 1000 MG tablet Take 1 tablet by mouth 2 times daily (with meals)       Incretin Mimetic Agents       MOUNJARO 5 MG/0.5ML SOAJ Inject 5 mg into the skin once a week        .  A1c levels reviewed--will recheck. Encourage routine foot care. Recommend routine eye exams.  Encourage diet and exercise and medication adherence. Advised to get sugars under better control and to follow up with endocrinologist  Follows with endocrinology  4. Mixed hyperlipidemia  Overview:  Statin: yes.  Tolerating medication well and achieving desired therapeutic response. Will continue with lipitor. Will recheck lipid levels. Encourage healthy eating and exercise as able.  5. Overweight (BMI 25.0-29.9)    All questions and concerns answered. Patient voiced understanding and agrees with POC.    FOLLOW UP:  Return in about 2 months (around 5/3/2025) for

## 2025-03-08 LAB
COLLECTION METHOD: NORMAL
CYTOLOGIST CVX/VAG CYTO: NORMAL
CYTOLOGY CVX/VAG DOC THIN PREP: NORMAL
DATE OF LMP: NORMAL
HPV APTIMA: NEGATIVE
Lab: NORMAL
PAP SOURCE: NORMAL
PATH REPORT.FINAL DX SPEC: NORMAL
STAT OF ADQ CVX/VAG CYTO-IMP: NORMAL

## 2025-03-10 ENCOUNTER — RESULTS FOLLOW-UP (OUTPATIENT)
Dept: INTERNAL MEDICINE CLINIC | Facility: CLINIC | Age: 45
End: 2025-03-10

## 2025-03-14 NOTE — TELEPHONE ENCOUNTER
----- Message from Dr. Taniya Ramos DO sent at 3/10/2025 11:01 PM EDT -----  Pap/HPV is negative (normal, no abnormal growth for discharge seen as well). Please inform patient that referral to ob/gyn has been placed.

## 2025-03-24 DIAGNOSIS — E11.65 UNCONTROLLED TYPE 2 DIABETES MELLITUS WITH HYPERGLYCEMIA, WITHOUT LONG-TERM CURRENT USE OF INSULIN (HCC): ICD-10-CM

## 2025-03-25 RX ORDER — TIRZEPATIDE 5 MG/.5ML
INJECTION, SOLUTION SUBCUTANEOUS
Qty: 4 ML | Refills: 0 | OUTPATIENT
Start: 2025-03-25

## 2025-04-04 ENCOUNTER — OFFICE VISIT (OUTPATIENT)
Dept: OBGYN CLINIC | Age: 45
End: 2025-04-04

## 2025-04-04 VITALS
HEIGHT: 70 IN | BODY MASS INDEX: 21.62 KG/M2 | SYSTOLIC BLOOD PRESSURE: 104 MMHG | DIASTOLIC BLOOD PRESSURE: 62 MMHG | WEIGHT: 151 LBS

## 2025-04-04 DIAGNOSIS — N89.8 VAGINAL DISCHARGE: ICD-10-CM

## 2025-04-04 DIAGNOSIS — N76.0 ACUTE VAGINITIS: ICD-10-CM

## 2025-04-04 DIAGNOSIS — Z76.89 ESTABLISHING CARE WITH NEW DOCTOR, ENCOUNTER FOR: Primary | ICD-10-CM

## 2025-04-04 DIAGNOSIS — N89.8 VAGINAL ITCHING: ICD-10-CM

## 2025-04-04 RX ORDER — TERCONAZOLE 4 MG/G
CREAM VAGINAL
Qty: 45 G | Refills: 0 | Status: SHIPPED | OUTPATIENT
Start: 2025-04-04 | End: 2025-04-11

## 2025-04-04 RX ORDER — FLUCONAZOLE 150 MG/1
150 TABLET ORAL
Qty: 2 TABLET | Refills: 0 | Status: SHIPPED | OUTPATIENT
Start: 2025-04-04 | End: 2025-04-10

## 2025-04-04 NOTE — PROGRESS NOTES
CC:   Chief Complaint   Patient presents with    Vaginal Discharge    Vaginal Itching       HPI:    Yusra  is a 44 y.o. NEW Patient, No obstetric history on file., Patient's last menstrual period was 03/04/2025., who is seen today for vaginal discharge and itching and to establish care with our office. The patient know known Type 2 diabetic currently taking Novolog, Metformin and Mounjaro which is being managed by her primary care.   The patient states she has been experiencing above symptoms for the past few months that \"come and go.\" She describes vaginal discharge as \"clear/whitish thin, consistency of an egg.\" She denies fevers, chills, vaginal odor, urinary frequency, urgency, or dysuria today.     She denies recent changes to soaps or laundry detergents.   States uses soaps with scents and has started taking Uro probiotics daily for the past month.       Contraception: Condoms (some times)      Menses:  Q  30 Days x 8-10 days, Moderate Flow, Denies intermenstrual VB/spotting or dysmenorrhea.    Sexually active with male partner. No concerns fotr STDs-declines STD testing today.   Denies post coital VB or dyspareunia.        GYN HISTORY:  As per HPI     Hx STDs: Denies    Last Pap: 3/2025-Neg cotesting  Hx abnormal paps: Denies    Mammo: 2021: BI-RADS 1-Negative    PCP: Dr. Taniya Ramos. Sees regularly. Last visit March 3, 2025.     Recent labs collected     October 2024    A1C: 9.0  CMP: Cr: 0.67, elevated AST-42  Lipids wnl with exception of elevated HDL (good cholesterol)-72  TSH wnl-2.32      Current Outpatient Medications on File Prior to Visit   Medication Sig Dispense Refill    Probiotic Product (PROBIOTIC DAILY PO) Take by mouth       No current facility-administered medications on file prior to visit.         Past Medical History:   Diagnosis Date    BMI 39.0-39.9,adult     Controlled type 2 diabetes mellitus without complication, with long-term current use of insulin 06/19/2020    followed by

## 2025-04-09 ENCOUNTER — RESULTS FOLLOW-UP (OUTPATIENT)
Dept: OBGYN CLINIC | Age: 45
End: 2025-04-09

## 2025-04-09 ENCOUNTER — OFFICE VISIT (OUTPATIENT)
Dept: ENDOCRINOLOGY | Age: 45
End: 2025-04-09
Payer: COMMERCIAL

## 2025-04-09 VITALS
HEART RATE: 75 BPM | HEIGHT: 69 IN | SYSTOLIC BLOOD PRESSURE: 118 MMHG | BODY MASS INDEX: 21.8 KG/M2 | OXYGEN SATURATION: 99 % | WEIGHT: 147.2 LBS | DIASTOLIC BLOOD PRESSURE: 70 MMHG

## 2025-04-09 DIAGNOSIS — E11.65 CONTROLLED TYPE 2 DIABETES MELLITUS WITH HYPERGLYCEMIA, WITHOUT LONG-TERM CURRENT USE OF INSULIN (HCC): Primary | ICD-10-CM

## 2025-04-09 DIAGNOSIS — E78.2 MIXED HYPERLIPIDEMIA: ICD-10-CM

## 2025-04-09 LAB
A VAGINAE DNA VAG QL NAA+PROBE: NORMAL SCORE
BACTERIAL VAGINOSIS, NAA: NORMAL
BVAB2 DNA VAG QL NAA+PROBE: NORMAL SCORE
C ALBICANS DNA VAG QL NAA+PROBE: NEGATIVE
C GLABRATA DNA VAG QL NAA+PROBE: NEGATIVE
CANDIDA KRUSEI: NEGATIVE
CANDIDA LUSITANIAE, NAA: NEGATIVE
CANDIDA PARAPSILOSIS/TROPICALIS: NEGATIVE
HBA1C MFR BLD: 6.4 %
MEGA1 DNA VAG QL NAA+PROBE: NORMAL SCORE
SPECIMEN SOURCE: NORMAL
T VAGINALIS RRNA SPEC QL NAA+PROBE: NEGATIVE

## 2025-04-09 PROCEDURE — 3044F HG A1C LEVEL LT 7.0%: CPT | Performed by: INTERNAL MEDICINE

## 2025-04-09 PROCEDURE — 83036 HEMOGLOBIN GLYCOSYLATED A1C: CPT | Performed by: INTERNAL MEDICINE

## 2025-04-09 PROCEDURE — 99214 OFFICE O/P EST MOD 30 MIN: CPT | Performed by: INTERNAL MEDICINE

## 2025-04-09 RX ORDER — BLOOD SUGAR DIAGNOSTIC
STRIP MISCELLANEOUS
Qty: 200 EACH | Refills: 3 | Status: SHIPPED | OUTPATIENT
Start: 2025-04-09

## 2025-04-09 RX ORDER — TIRZEPATIDE 5 MG/.5ML
5 INJECTION, SOLUTION SUBCUTANEOUS WEEKLY
Qty: 6 ML | Refills: 3 | Status: SHIPPED | OUTPATIENT
Start: 2025-04-09

## 2025-04-09 RX ORDER — ATORVASTATIN CALCIUM 40 MG/1
40 TABLET, FILM COATED ORAL NIGHTLY
Qty: 90 TABLET | Refills: 3 | Status: SHIPPED | OUTPATIENT
Start: 2025-04-09

## 2025-04-09 RX ORDER — LANCETS 30 GAUGE
EACH MISCELLANEOUS
Qty: 200 EACH | Refills: 3 | Status: SHIPPED | OUTPATIENT
Start: 2025-04-09

## 2025-04-09 ASSESSMENT — ENCOUNTER SYMPTOMS
SHORTNESS OF BREATH: 0
DIARRHEA: 0
NAUSEA: 0
VOMITING: 0
CONSTIPATION: 0

## 2025-04-09 NOTE — PROGRESS NOTES
Bj Saravia MD, FACE  [facility]  [address] [place] [address], [address]  [place] [address]  Phone [phone]  Facsimile [phone]          Yusra Crisostomo is a 44 y.o. female seen 4/9/2025 for follow-up of type 2 diabetes mellitus        ASSESSMENT AND PLAN:    1. Controlled type 2 diabetes mellitus with hyperglycemia, without long-term current use of insulin (HCC)  Her glycemic control has improved and her hemoglobin A1c is at goal less than 7.  According to her glucometer, she is having frequent fasting hypoglycemia.  She states that she does not have any hypoglycemic symptoms, however, and I suspect that her glucometer is not accurate.  I will decrease her metformin as below, however.  Eye exam negative 1/2025.  Urine microalbumin negative 10/2024.  Update labs prior to next visit.    - MOUNJARO 5 MG/0.5ML SOAJ; Inject 5 mg into the skin once a week  Dispense: 6 mL; Refill: 3  - metFORMIN (GLUCOPHAGE) 500 MG tablet; Take 1 tablet by mouth 2 times daily (with meals)  Dispense: 180 tablet; Refill: 3  - ONETOUCH VERIO strip; 2 times a day  Dispense: 200 each; Refill: 3  - Lancets (ONETOUCH DELICA PLUS [id number]) MISC; 2 times a day  Dispense: 200 each; Refill: 3    2. Mixed hyperlipidemia  LDL not quite at goal less than 70 10/2024.    - atorvastatin (LIPITOR) 40 MG tablet; Take 1 tablet by mouth at bedtime  Dispense: 90 tablet; Refill: 3      Follow-up and Dispositions    Return in about 4 months (around 8/9/2025).         HISTORY OF PRESENT ILLNESS:    DIABETES MELLITUS     Diagnosis: Type 2 diabetes mellitus diagnosed in 2005.     Diet: She avoids sweets for the most part.  She is trying to limit carbohydrates.     Exercise: No regular exercise.     Monitoring:  One Touch Verio - 1 time a day.     Blood Glucose: By review of meter: .        Hypoglycemia: She does not feel hypoglycemic when her glucometer states that she is low.       Hemoglobin

## 2025-04-10 RX ORDER — CLOTRIMAZOLE AND BETAMETHASONE DIPROPIONATE 10; .64 MG/G; MG/G
CREAM TOPICAL
Qty: 45 G | Refills: 1 | Status: SHIPPED | OUTPATIENT
Start: 2025-04-10

## 2025-05-07 ENCOUNTER — OFFICE VISIT (OUTPATIENT)
Dept: INTERNAL MEDICINE CLINIC | Facility: CLINIC | Age: 45
End: 2025-05-07
Payer: COMMERCIAL

## 2025-05-07 VITALS
HEIGHT: 69 IN | WEIGHT: 140 LBS | DIASTOLIC BLOOD PRESSURE: 60 MMHG | HEART RATE: 70 BPM | SYSTOLIC BLOOD PRESSURE: 102 MMHG | TEMPERATURE: 98.2 F | OXYGEN SATURATION: 100 % | BODY MASS INDEX: 20.73 KG/M2

## 2025-05-07 DIAGNOSIS — Z12.31 BREAST CANCER SCREENING BY MAMMOGRAM: ICD-10-CM

## 2025-05-07 DIAGNOSIS — E78.2 MIXED HYPERLIPIDEMIA: ICD-10-CM

## 2025-05-07 DIAGNOSIS — E11.65 CONTROLLED TYPE 2 DIABETES MELLITUS WITH HYPERGLYCEMIA, WITHOUT LONG-TERM CURRENT USE OF INSULIN (HCC): Primary | ICD-10-CM

## 2025-05-07 PROBLEM — E11.9 DIABETES MELLITUS (HCC): Status: ACTIVE | Noted: 2025-05-07

## 2025-05-07 PROBLEM — E11.9 DIABETES MELLITUS (HCC): Status: RESOLVED | Noted: 2025-05-07 | Resolved: 2025-05-07

## 2025-05-07 PROBLEM — E11.69 TYPE 2 DIABETES MELLITUS WITH OTHER SPECIFIED COMPLICATION, WITHOUT LONG-TERM CURRENT USE OF INSULIN (HCC): Status: ACTIVE | Noted: 2025-04-09

## 2025-05-07 PROCEDURE — 3044F HG A1C LEVEL LT 7.0%: CPT | Performed by: FAMILY MEDICINE

## 2025-05-07 PROCEDURE — 99213 OFFICE O/P EST LOW 20 MIN: CPT | Performed by: FAMILY MEDICINE

## 2025-05-07 ASSESSMENT — PATIENT HEALTH QUESTIONNAIRE - PHQ9
SUM OF ALL RESPONSES TO PHQ QUESTIONS 1-9: 0
2. FEELING DOWN, DEPRESSED OR HOPELESS: NOT AT ALL
SUM OF ALL RESPONSES TO PHQ QUESTIONS 1-9: 0
1. LITTLE INTEREST OR PLEASURE IN DOING THINGS: NOT AT ALL

## 2025-05-07 NOTE — PROGRESS NOTES
Taniya Ramos DO  [facility] [place] [address]  Phone [phone]  Fax [phone]      Yusra Crisostomo (: 1980) is a 44 y.o. female, here for evaluation of the following chief complaint(s):  Follow-up       ASSESSMENT/PLAN:  1. Controlled type 2 diabetes mellitus with hyperglycemia, without long-term current use of insulin (HCC)  Overview:  Tolerating medication well and achieving desired therapeutic response.  Will continue with:   Diabetic Medications       Biguanides       metFORMIN (GLUCOPHAGE) 500 MG tablet Take 1 tablet by mouth 2 times daily (with meals)       Incretin Mimetic Agents       MOUNJARO 5 MG/0.5ML SOAJ Inject 5 mg into the skin once a week        .  A1c levels reviewed--will recheck. Encourage routine foot care. Recommend routine eye exams.  Encourage diet and exercise and medication adherence.   Follows with endocrinology  2. Mixed hyperlipidemia  Overview:  Statin: yes.  Tolerating medication well and achieving desired therapeutic response. Will continue with lipitor. Will recheck lipid levels. Encourage healthy eating and exercise as able.  3. Breast cancer screening by mammogram  -     [facility] NAHOMI DIGITAL SCREEN BILATERAL; Future    All questions and concerns answered. Patient voiced understanding and agrees with POC.    FOLLOW UP:  Return in about 1 year (around 2026) for Physical.    SUBJECTIVE/OBJECTIVE:  HPI: Patient is a 44-year-old female who presents today for follow-up.  She states she is doing good.  She states her A1c is under control and did recently see the endocrinologist for it.  She gets most of the labs done with the endocrinologist and has a follow-up appointment scheduled again in August.  She states she is taking the medication and is tolerating it well without any problems or side effects.  She did follow-up with the OB/GYN as referred and had testings done.  She states everything came out good.  She states

## 2025-05-09 ENCOUNTER — HOSPITAL ENCOUNTER (OUTPATIENT)
Dept: MAMMOGRAPHY | Age: 45
Discharge: HOME | End: 2025-05-12
Attending: FAMILY MEDICINE
Payer: COMMERCIAL

## 2025-05-09 DIAGNOSIS — Z12.31 BREAST CANCER SCREENING BY MAMMOGRAM: ICD-10-CM

## 2025-05-09 PROCEDURE — 77063 BREAST TOMOSYNTHESIS BI: CPT

## 2025-05-27 ENCOUNTER — RESULTS FOLLOW-UP (OUTPATIENT)
Dept: INTERNAL MEDICINE CLINIC | Facility: CLINIC | Age: 45
End: 2025-05-27

## 2025-08-22 DIAGNOSIS — E78.2 MIXED HYPERLIPIDEMIA: ICD-10-CM

## 2025-08-22 DIAGNOSIS — E11.65 CONTROLLED TYPE 2 DIABETES MELLITUS WITH HYPERGLYCEMIA, WITHOUT LONG-TERM CURRENT USE OF INSULIN (HCC): ICD-10-CM

## 2025-08-22 LAB
ALBUMIN SERPL-MCNC: 3.7 G/DL (ref 3.5–5)
ALBUMIN/GLOB SERPL: 1.5 (ref 1–1.9)
ALP SERPL-CCNC: 75 U/L (ref 35–104)
ALT SERPL-CCNC: 24 U/L (ref 8–45)
ANION GAP SERPL CALC-SCNC: 12 MMOL/L (ref 7–16)
AST SERPL-CCNC: 29 U/L (ref 15–37)
BILIRUB SERPL-MCNC: 0.5 MG/DL (ref 0–1.2)
BUN SERPL-MCNC: 15 MG/DL (ref 6–23)
CALCIUM SERPL-MCNC: 9.3 MG/DL (ref 8.8–10.2)
CHLORIDE SERPL-SCNC: 102 MMOL/L (ref 98–107)
CHOLEST SERPL-MCNC: 145 MG/DL (ref 0–200)
CO2 SERPL-SCNC: 25 MMOL/L (ref 20–29)
CREAT SERPL-MCNC: 0.71 MG/DL (ref 0.6–1.1)
CREAT UR-MCNC: 182 MG/DL (ref 28–217)
EST. AVERAGE GLUCOSE BLD GHB EST-MCNC: 180 MG/DL
GLOBULIN SER CALC-MCNC: 2.5 G/DL (ref 2.3–3.5)
GLUCOSE SERPL-MCNC: 144 MG/DL (ref 70–99)
HBA1C MFR BLD: 7.9 % (ref 0–5.6)
HDLC SERPL-MCNC: 78 MG/DL (ref 40–60)
HDLC SERPL: 1.9 (ref 0–5)
LDLC SERPL CALC-MCNC: 56 MG/DL (ref 0–100)
MICROALBUMIN UR-MCNC: <1.2 MG/DL (ref 0–20)
MICROALBUMIN/CREAT UR-RTO: NORMAL MG/G (ref 0–30)
POTASSIUM SERPL-SCNC: 4.3 MMOL/L (ref 3.5–5.1)
PROT SERPL-MCNC: 6.2 G/DL (ref 6.3–8.2)
SODIUM SERPL-SCNC: 139 MMOL/L (ref 136–145)
TRIGL SERPL-MCNC: 54 MG/DL (ref 0–150)
TSH W FREE THYROID IF ABNORMAL: 3.12 UIU/ML (ref 0.27–4.2)
VLDLC SERPL CALC-MCNC: 11 MG/DL (ref 6–23)

## 2025-08-27 ENCOUNTER — OFFICE VISIT (OUTPATIENT)
Dept: ENDOCRINOLOGY | Age: 45
End: 2025-08-27
Payer: COMMERCIAL

## 2025-08-27 VITALS
HEIGHT: 69 IN | WEIGHT: 142 LBS | RESPIRATION RATE: 18 BRPM | SYSTOLIC BLOOD PRESSURE: 106 MMHG | BODY MASS INDEX: 21.03 KG/M2 | HEART RATE: 70 BPM | OXYGEN SATURATION: 98 % | DIASTOLIC BLOOD PRESSURE: 64 MMHG

## 2025-08-27 DIAGNOSIS — E11.65 UNCONTROLLED TYPE 2 DIABETES MELLITUS WITH HYPERGLYCEMIA, WITHOUT LONG-TERM CURRENT USE OF INSULIN (HCC): Primary | ICD-10-CM

## 2025-08-27 DIAGNOSIS — E78.2 MIXED HYPERLIPIDEMIA: ICD-10-CM

## 2025-08-27 PROCEDURE — 3051F HG A1C>EQUAL 7.0%<8.0%: CPT | Performed by: INTERNAL MEDICINE

## 2025-08-27 PROCEDURE — 99214 OFFICE O/P EST MOD 30 MIN: CPT | Performed by: INTERNAL MEDICINE

## 2025-08-27 RX ORDER — BLOOD SUGAR DIAGNOSTIC
STRIP MISCELLANEOUS
Qty: 200 EACH | Refills: 3 | Status: SHIPPED | OUTPATIENT
Start: 2025-08-27

## 2025-08-27 RX ORDER — LANCETS 30 GAUGE
EACH MISCELLANEOUS
Qty: 200 EACH | Refills: 3 | Status: SHIPPED | OUTPATIENT
Start: 2025-08-27

## 2025-08-27 RX ORDER — ATORVASTATIN CALCIUM 40 MG/1
40 TABLET, FILM COATED ORAL NIGHTLY
Qty: 90 TABLET | Refills: 3 | Status: SHIPPED | OUTPATIENT
Start: 2025-08-27

## 2025-08-27 RX ORDER — TIRZEPATIDE 5 MG/.5ML
5 INJECTION, SOLUTION SUBCUTANEOUS WEEKLY
Qty: 6 ML | Refills: 3 | Status: SHIPPED | OUTPATIENT
Start: 2025-08-27

## 2025-08-27 ASSESSMENT — ENCOUNTER SYMPTOMS
NAUSEA: 0
SHORTNESS OF BREATH: 0
CONSTIPATION: 0
DIARRHEA: 0
VOMITING: 0

## (undated) DEVICE — 3000CC GUARDIAN II: Brand: GUARDIAN

## (undated) DEVICE — CATH FOL TY IC BAG 16FR 2000ML -- CONVERT TO ITEM 363158

## (undated) DEVICE — SUTURE PLN GUT SZ 2-0 L27IN ABSRB YELLOWISH TAN L40MM CT 853H

## (undated) DEVICE — GARMENT,MEDLINE,DVT,INT,CALF,MED, GEN2: Brand: MEDLINE

## (undated) DEVICE — DRAPE,UNDERBUTTOCKS,PCH,STERILE: Brand: MEDLINE

## (undated) DEVICE — KENDALL SCD EXPRESS SLEEVES, KNEE LENGTH, MEDIUM: Brand: KENDALL SCD

## (undated) DEVICE — CARDINAL HEALTH FLEXIBLE LIGHT HANDLE COVER: Brand: CARDINAL HEALTH

## (undated) DEVICE — STERILE POLYISOPRENE POWDER-FREE SURGICAL GLOVES: Brand: PROTEXIS

## (undated) DEVICE — DRAPE TWL SURG 16X26IN BLU ORB04] ALLCARE INC]

## (undated) DEVICE — SOLUTION IRRIG 3000ML 0.9% SOD CHL FLX CONT 0797208] ICU MEDICAL INC]

## (undated) DEVICE — MINOR LITHOTOMY PACK: Brand: MEDLINE INDUSTRIES, INC.

## (undated) DEVICE — Device

## (undated) DEVICE — TUBING, SUCTION, 1/4" X 10', STRAIGHT: Brand: MEDLINE

## (undated) DEVICE — PREP SKN DURAPREP 26ML APPL --

## (undated) DEVICE — PENCIL ES L3M BTTN SWCH S STL HEX LOK BLDE ELECTRD HOLSTER

## (undated) DEVICE — SUT CHRMC 1 36IN CTX BRN --

## (undated) DEVICE — REM POLYHESIVE ADULT PATIENT RETURN ELECTRODE: Brand: VALLEYLAB

## (undated) DEVICE — MEDI-VAC NON-CONDUCTIVE SUCTION TUBING: Brand: CARDINAL HEALTH

## (undated) DEVICE — SUT CHRMC 1 27IN CT1 BRN --

## (undated) DEVICE — APPLICATOR BNDG 1MM ADH PREMIERPRO EXOFIN

## (undated) DEVICE — SOLUTION IRRIG 1000ML H2O STRL BLT

## (undated) DEVICE — GOWN,REINF,POLY,ECL,PP SLV,XL: Brand: MEDLINE

## (undated) DEVICE — SOLUTION IV 1000ML 0.9% SOD CHL

## (undated) DEVICE — SUTURE MCRYL SZ 4-0 L27IN ABSRB UD L19MM PS-2 1/2 CIR PRIM Y426H

## (undated) DEVICE — SURGICAL PROCEDURE PACK C SECT CDS